# Patient Record
Sex: MALE | Race: WHITE
[De-identification: names, ages, dates, MRNs, and addresses within clinical notes are randomized per-mention and may not be internally consistent; named-entity substitution may affect disease eponyms.]

---

## 2017-05-04 NOTE — RADHPO
EXAM DATE/TIME:  05/04/2017 19:37 

 

[facility] COMPARISON:     

No previous studies available for comparison.

 

 

INDICATIONS :     

Diffuse abdominal pain. 

                      

 

IV CONTRAST:     

90 cc Omnipaque 350 (iohexol) IV 

 

 

ORAL CONTRAST:      

No oral contrast ingested.

                      

 

RADIATION DOSE:     

4.45 CTDIvol (mGy) 

 

 

MEDICAL HISTORY :     

Chronic obstructive pulmonary disease.  Miguel's esophagus. non hodgkins lymphoma.

 

SURGICAL HISTORY :      

None. 

 

ENCOUNTER:      

Initial

 

ACUITY:      

1 day

 

PAIN SCALE:      

6/10

 

LOCATION:         

Abdomen. 

 

TECHNIQUE:     

Volumetric scanning of the abdomen and pelvis was performed.  Using automated exposure control and ad
justment of the mA and/or kV according to patient size, radiation dose was kept as low as reasonably 
achievable to obtain optimal diagnostic quality images. 

 

FINDINGS:     

 

LOWER LUNGS:     

There is chronic appearing interstitial disease seen at the lung bases being worse on the right. Ther
e is superimposed consolidation at the bases being worse on the right.

 

LIVER:     

Homogeneous density without lesion.  There is no dilation of the biliary tree.  No calcified gallston
es.

 

SPLEEN:     

Normal size without lesion.

 

PANCREAS:     

Within normal limits.

 

KIDNEYS:     

Normal in size and shape.  There is no mass, stone or hydronephrosis.

 

ADRENAL GLANDS:     

Within normal limits.

 

VASCULAR:     

Atherosclerotic calcifications are seen throughout the arterial system. There is a 3.4 cm infrarenal 
abdominal aortic aneurysm.

 

BOWEL/MESENTERY:     

The stomach, small bowel, and colon demonstrate no acute abnormality.  There is no free intraperitone
al air or fluid.

 

ABDOMINAL WALL:     

Within normal limits.

 

RETROPERITONEUM:     

There is no lymphadenopathy. 

 

BLADDER:     

No wall thickening or mass. 

 

REPRODUCTIVE:     

Within normal limits.

 

INGUINAL:     

There is no lymphadenopathy or hernia. 

 

MUSCULOSKELETAL:     

There is degenerative change in the lower lumbar spine. There appears to be possible pars defect and 
spondylolisthesis at the L5-S1 level.

 

CONCLUSION:     

1. No acute intra-abdominal abnormality is seen.

2. Chronic changes the lung bases with some superimposed consolidation. These findings are worse on t
he right.

3. 3.4 cm abdominal aortic aneurysm.

4. Anterior spondylolisthesis of L5 on S1.

 

 

 

 Malik Carnes MD on May 04, 2017 at 20:12           

Board Certified Radiologist.

 This report was verified electronically.

## 2017-05-04 NOTE — RADHPO
EXAM DATE/TIME:  05/04/2017 19:31 

 

[facility] COMPARISON:     

No previous studies available for comparison.

 

 

INDICATIONS :     

Altered mental status. 

                      

 

RADIATION DOSE:     

62.13 CTDIvol (mGy) 

 

 

 

MEDICAL HISTORY :     

Dementia.  

 

SURGICAL HISTORY :      

None. 

 

ENCOUNTER:      

Initial

 

ACUITY:      

1 month

 

PAIN SCALE:      

4/10

 

LOCATION:        

cranial 

 

TECHNIQUE:     

Multiple contiguous axial images were obtained of the head.  Using automated exposure control and adj
ustment of the mA and/or kV according to patient size, radiation dose was kept as low as reasonably a
chievable to obtain optimal diagnostic quality images. 

 

FINDINGS:     

 

CEREBRUM:     

The ventricles and cortical sulci are widened. There is low-density seen throughout the cerebral whit
e matter.  No evidence of midline shift, mass lesion, hemorrhage or acute infarction.  No extra-axial
 fluid collections are seen.

 

POSTERIOR FOSSA:     

The cerebellum and brainstem are intact.  The 4th ventricle is midline.  The cerebellopontine angle i
s unremarkable.

 

EXTRACRANIAL:     

The visualized portion of the orbits is intact.

 

SKULL:     

The calvaria is intact.  No evidence of skull fracture.

 

CONCLUSION:     

1. No acute abnormality.

2. Age-related atrophy and suspected small vessel ischemic change in the white matter.

 

 

 

 Malik Carnes MD on May 04, 2017 at 20:05           

Board Certified Radiologist.

 This report was verified electronically.

## 2017-05-04 NOTE — PD
HPI


Chief Complaint:  Altered Mental Status


Time Seen by Provider:  17:26


Travel History


International Travel<30 days:  No


Contact w/Intl Traveler<30days:  No


Traveled to known affect area:  No





History of Present Illness


HPI


76-year-old male seems to have multiple issues.  He was referred here from Dr. Dotson's office.  He is here with a gentleman who knows him who says that 

the gentleman has had a dramatic decline in his mental abilities in the last 

month or so.  The patient lives in a house with his wife.  He reportedly has a 

history of lymphoma and is seen by Dr. David.  Patient is not aware of what kind 

of treatment he has had.  His complaint to me is that he is short of breath.  

He has been a smoker in the past.  He says that he stopped 3 months ago though 

he actually has no idea what the date is now.  The patient says she's been 

having some pain across his abdomen.  He does not have old records at this 

hospital there is indication that he has a history of COPD non-Hodgkin's 

lymphoma, GERD with Miguel's esophagus,abdominal aortic aneurysm.  He lives 

with his wife who has dementia.  The patient apparently has been driving.  I 

have tried to contact Dr. David but HE is not available.  The patient did have a 

CT of his chest done a few months ago which showed some adenopathy and 

emphysematous changes.  He also had a CT of his abdomen and pelvis which showed 

some inguinal adenopathy.  He had a 2.8 cm abdominal aortic aneurysm.





PFSH


Past Medical History


COPD:  Yes





Social History


Tobacco Use:  No





Allergies-Medications


(Allergen,Severity, Reaction):  


Coded Allergies:  


     No Known Allergies (Unverified , 5/4/17)


Reported Meds & Prescriptions





Reported Meds & Active Scripts


Active


Active Prescriptions or Reported Medications Unobtainable    








Review of Systems


General / Constitutional:  Positive: Weight Loss,  No: Fever, Chills


Eyes:  No: Diploplia, Blurred Vision


HENT:  No: Headaches, Neck Stiffness


Respiratory:  Positive: Cough, Shortness of Breath


Gastrointestinal:  Positive: Nausea, Abdominal Pain, Loss of Appetite


Genitourinary:  No: Urgency, Frequency


Musculoskeletal:  No: Myalgias, Arthralgias


Skin:  No Rash


Neurologic:  Positive: Change in Mentation


Endocrine:  No: Heat Intolerance


Hematologic/Lymphatic:  No: Easy Bruising





Physical Exam


Narrative


GENERAL thin cachectic appearing male oxygen saturation is 92% on room air.  He 

is in mild respiratory distress


SKIN: Focused skin assessment warm/dry.  Her skin turgor


HEAD: Atraumatic. Normocephalic. 


EYES: Pupils equal and round. No scleral icterus. No injection or drainage. 


ENT: No nasal bleeding or discharge.  Mucous membranes pink and moist.


NECK: Trachea midline. No JVD. 


CARDIOVASCULAR: Regular rate and rhythm.  No murmur appreciated.


RESPIRATORY: Diminished breath sounds bilaterally.  Occasional rhonchi


GASTROINTESTINAL: Abdomen soft, there is some diffuse abdominal tenderness, 

nondistended. Hepatic and splenic margins not palpable. 


MUSCULOSKELETAL: No obvious deformities. No clubbing.  No cyanosis.  No edema. 


NEUROLOGICAL: Awake and alert. No obvious cranial nerve deficits.  Motor 

grossly within normal limits. Normal speech.  Patient is quite confused.  He is 

oriented to place but not time


PSYCHIATRIC: Insight limited





Data


Data


Last Documented VS





Vital Signs








  Date Time  Temp Pulse Resp B/P Pulse Ox O2 Delivery O2 Flow Rate FiO2


 


5/4/17 19:38      Room Air  21


 


5/4/17 18:30  90 16 103/64 95   


 


5/4/17 17:25 99.1       








Orders





 Electrocardiogram (5/4/17 17:37)


Complete Blood Count With Diff (5/4/17 17:37)


Comprehensive Metabolic Panel (5/4/17 17:37)


Troponin I (5/4/17 17:37)


Prothrombin Time / Inr (Pt) (5/4/17 17:37)


Act Partial Throm Time (Ptt) (5/4/17 17:37)


Blood Culture (5/4/17 17:37)


Lipase (5/4/17 17:37)


Urinalysis - C+S If Indicated (5/4/17 17:37)


Magnesium (Mg) (5/4/17 17:37)


Thyroid Stimulating Hormone (5/4/17 17:37)


Chest, Single Ap (5/4/17 17:37)


Albuterol-Ipratropium Neb (Duoneb Neb) (5/4/17 17:45)


Sodium Chlor 0.9% 1000 Ml Inj (Ns 1000 M (5/4/17 18:00)


Ct Brain W/O Iv Contrast(Rout) (5/4/17 18:14)


Ct Abd/Pel W Iv Contrast(Rout) (5/4/17 18:14)


Methylprednisolone So Succ Inj (Solumedr (5/4/17 19:00)


Pantoprazole Inj (Protonix Inj) (5/4/17 19:00)


Piperacil-Tazo 3.375 Gm Premix (Zosyn 3. (5/4/17 19:15)


Iohexol 350 Inj (Omnipaque 350 Inj) (5/4/17 19:59)


Admit Order (Ed Use Only) (5/4/17 20:50)





Labs





 Laboratory Tests








Test 5/4/17





 18:20


 


White Blood Count 23.1 TH/MM3


 


Red Blood Count 4.68 MIL/MM3


 


Hemoglobin 15.1 GM/DL


 


Hematocrit 44.8 %


 


Mean Corpuscular Volume 95.6 FL


 


Mean Corpuscular Hemoglobin 32.3 PG


 


Mean Corpuscular Hemoglobin 33.7 %





Concent 


 


Red Cell Distribution Width 12.3 %


 


Platelet Count 336 TH/MM3


 


Mean Platelet Volume 8.1 FL


 


Neutrophils (%) (Auto) 69.5 %


 


Lymphocytes (%) (Auto) 20.7 %


 


Monocytes (%) (Auto) 8.0 %


 


Eosinophils (%) (Auto) 0.1 %


 


Basophils (%) (Auto) 1.7 %


 


Neutrophils # (Auto) 16.1 TH/MM3


 


Lymphocytes # (Auto) 4.8 TH/MM3


 


Monocytes # (Auto) 1.8 TH/MM3


 


Eosinophils # (Auto) 0.0 TH/MM3


 


Basophils # (Auto) 0.4 TH/MM3


 


CBC Comment DIFF FINAL 


 


Differential Comment  


 


Prothrombin Time 10.9 SEC


 


Prothromb Time International 1.0 RATIO





Ratio 


 


Activated Partial 24.0 SEC





Thromboplast Time 


 


Sodium Level 142 MEQ/L


 


Potassium Level 4.0 MEQ/L


 


Chloride Level 104 MEQ/L


 


Carbon Dioxide Level 30.7 MEQ/L


 


Anion Gap 7 MEQ/L


 


Blood Urea Nitrogen 20 MG/DL


 


Creatinine 0.70 MG/DL


 


Estimat Glomerular Filtration 109 ML/MIN





Rate 


 


Random Glucose 127 MG/DL


 


Calcium Level 9.4 MG/DL


 


Magnesium Level 2.5 MG/DL


 


Total Bilirubin 0.5 MG/DL


 


Aspartate Amino Transf 20 U/L





(AST/SGOT) 


 


Alanine Aminotransferase 26 U/L





(ALT/SGPT) 


 


Alkaline Phosphatase 75 U/L


 


Troponin I LESS THAN 0.02





 NG/ML


 


Total Protein 7.0 GM/DL


 


Albumin 3.1 GM/DL


 


Lipase 85 U/L


 


Thyroid Stimulating Hormone 1.720 uIU/ML





3rd Gen 











[facility]


Medical Decision Making


Medical Screen Exam Complete:  Yes


Emergency Medical Condition:  Yes


Medical Record Reviewed:  Yes (chest x-ray shows hyperinflation suggestive of 

COPD.  There is a small right effusion with parenchymal consolidation right 

lateral lung base.)


Differential Diagnosis


Differential includes COPD exacerbation, pneumonia, altered mental status, 

subdural, abdominal pain


Narrative Course


Chest x-ray shows hyperinflation suggestive of COPD.  There is a small right 

effusion with parenchymal consolidation in the right lateral lung base.  White 

count is elevated 23,000.  Patient may be on Medrol now there is not clear what 

medication she has been taking.  He has been started on Zosyn and Solu-Medrol.  

CT scan of the head shows no acute abnormality.  There is age-related atrophy.  

CT of the abdomen and pelvis does not show any acute intra-abdominal 

abnormality.  There are chronic changes in the lung bases from post 

consolidation.  There is a 3.4 cm abdominal aortic aneurysm.  Patient has been 

given nebulizer treatments and Solu-Medrol.  He is given an initial dose of 

Zosyn.  He continues to complain of shortness of breath.  Etiology for his 

altered mental status is not clear.  He appears to have dementia.  From that is 

here with him says that he is deteriorating quite a bit in the past month





Diagnosis





 Primary Impression:  


 Pneumonia


 Qualified Code:  J18.1 - Pneumonia of right lower lobe due to infectious 

organism


 Additional Impressions:  


 COPD exacerbation


 Altered mental status


 Qualified Code:  R41.0 - Disorientation





Admitting Information


Admitting Physician Requests:  Admit


Scripts


Unable to Obtain Active Prescriptions or Reported Meds








Farhat Simon MD May 4, 2017 17:50

## 2017-05-04 NOTE — RADHPO
EXAM DATE/TIME:  05/04/2017 17:53 

 

[facility] COMPARISON:     

No previous studies available for comparison.

 

                     

INDICATIONS :     

Patient has been short of breath for four days.

                     

 

MEDICAL HISTORY :     

Chronic obstructive pulmonary disease.          

 

SURGICAL HISTORY :     

None.   

 

ENCOUNTER:     

Initial                                        

 

ACUITY:     

4 - 6 days      

 

PAIN SCORE:     

0/10

 

LOCATION:     

Bilateral chest 

 

FINDINGS:     

Single portable frontal view of the chest show the lungs to be hyperaerated. Blunting of the right co
stophrenic angle noted with increasing density in the adjacent lung parenchyma. Heart is normal in si
ze. Bony structures are unremarkable.

 

CONCLUSION:     

1. Hyperinflation suggesting COPD.

2. Small right effusion with parenchymal consolidation within the right lateral lung base. Followup s
tudies to document resolution suggested.

 

 

 

 Keith Andre Jr., MD on May 04, 2017 at 18:10           

Board Certified Radiologist.

 This report was verified electronically.

## 2017-05-05 NOTE — PD.CONS
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 this point.  There 

is no known written designation of health care surrogate.  Ordinarily, the 

patient's spouse would be the healthcare proxy.  However, wife has severe 

dementia..  Healthcare decision-making would then fall to the 2 children.  The 

sons live out of state and we currently do not have contact information for 

them.  Therefore, at this time, we don't have a legal decision-maker.





==  Goals of medical treatment:  Until such time as the patient's cognitive 

abilities improve and he becomes capacitated or until we are able to identify a 

health care proxy, we must continue with aggressive care including 

resuscitation attempts if necessary.





==  Getting additional information on this patient is very important.


* Recommend that Dr. David be contacted on Monday morning to see if he knows the 

status of the patient's lymphoma.  Also recommend we asked that office if they 

have a advance directive or contact information on file for health care decision

-maker.


* Recommend that Dr. Dotson be contacted on Monday morning to see if he has 

any additional information on status of the patient's lymphoma.  It is also 

important that we see if that office has an advanced directive on file or 

knowledge of inappropriate health care surrogate/proxy.


* I have contacted the patient's neighbor -- Johny Garcia -- who brought the 

patient and his wife to the doctor and to the emergency department.  This 

neighbor is taking care of the patient's dog and 2 cats and therefore has a key 

to the house.  I have asked the neighbor to see if there are phone numbers of 

either or both of the patient's sons on the refrigerator or near the telephone 

so we can try and contact them.





==  Pain: The patient denies any significant discomfort at this point in time.  

He did have abdominal pain at the time of presentation to the emergency 

department.  It could be his pain is being helped by the steroids.  Would 

recommend that pain be treated initially by acetaminophen.  If necessary, we 

can add an opiate analgesic.  Pain may also be due to constipationsee below





== Constipation:  Patient is unaware of his last bowel movement.  There is no 

mention of significant stool in the colon.  Nevertheless I would probably 

schedule the sennosides in addition to the PRN order. .





==  Weight loss/anorexia:  Patient is on intravenous steroids for his 

breathing.  It is likely this will stimulate appetite.





==  Dyspnea:  Dyspnea appears to be adequately treated with nasal cannula 

oxygen.  At this time which I away from opiates or benzos to treat dyspnea as 

this will probably exacerbate patient's confusion.





==  Confusion:  Confusion appears to be acute.  Etiology of the confusion is 

unclear at this time.  I am hoping that with hydration, nutrition, treatment of 

the COPD and possible infection that he will clear.  





==  History of alcohol abuse:  The patient is not a completely reliable 

historian, but he did indicate he can easily consume 8 or more beers per day.  

Uncertain when his last intake was.  May want to offer vitamins and watch for 

possible withdrawal symptoms.





==  Given the patient's history of Miguel's esophagus and given that he is now 

on high-dose steroids, may want to consider placing him on a proton pump 

inhibitor.





==  Disposition:  It is hard to imagine the patient being well enough following 

this acute care hospitalization to return home and be able to take care of his 

demented wife.  It is important that we contact family members if at all 

possible.  As noted above, the patient's neighbor, who was taking care of the 

patient's animals, we'll see if he can find contact information.  The wife also

, apparently, has a cousin who has been in touch.  Neighbor will see if that 

number is readily available in the house as well.





==  Palliative care will continue to follow to assist with symptom management.  

In addition, 


. if patient becomes capacitated or we are able to find a proxy decision-maker, 

palliative care will assist with discussions on goals of medical treatment as 

the clinical case evolves.





Time Spent


Total Floor Time (mins):  90 (Total floor time included chart review, patient 

examination, discussion with charge nurse, telephone call to the patient's 

neighbor, lengthy bedside conversation with patient noted above, and 

documentation.)


Face to Face Time (mins):  50


>50% Counseling/Coord of Care:  Yes





Thank you for the opportunity to participate in the care of Mr. Cano.


.





Attestation


To help prompt me to consider important information that might be impacting 

today's encounter and assessment, information from prior notes written by 

myself or my colleagues may have been "brought forward" into today's note.  My 

signature on this note, however, is an attestation that I personally performed 

the exam, history, and/or decision-making noted today, and, unless otherwise 

indicated, the interactions with patient, family, and staff as well as the 

review of records all occurred today.  I also attest that the listed assessment 

and stated plan reflect my best clinical judgment today based on the 

combination of historical information, prior notes, and today's exam/ 

interactions.  When time spent is documented, it refers only to time spent 

today by the signer, or if indicated, combined time spent today by 

collaborating physician/nurse practitioner.


.








Awais Alejandro MD May 5, 2017 19:24

## 2017-05-05 NOTE — EKG
Date Performed: 05/04/2017       Time Performed: 17:21:00

 

PTAGE:      76 years

 

EKG:      Sinus tachycardia with PAC(s). Left axis deviation Inferior infarct - age undetermined Low 
QRS voltages in limb leads Abnormal ECG 

 

NO PREVIOUS TRACING            

 

DOCTOR:   Isrrael Pedraza  Interpretating Date/Time  05/05/2017 15:32:52

## 2017-05-05 NOTE — MH
cc:

RASHAD FERRIS MD

****

 

DATE OF ADMISSION:  5/4/2017

 

CHIEF COMPLAINT

Altered mental status.

 

HISTORY OF PRESENT ILLNESS

This is a 76-year-old  male with past medical history significant for

COPD and multiple admissions for altered mental status in the past.  He lives

with his wife who has a history of dementia.  The patient's mental status is

also declining.  DCF is involved per the ER charge nurse and the 

is working on it.  The patient saw Dr. Dotson and had a decline in mental

status.  He has a history of lymphoma and is seen by Dr. David.  He is not aware

of what kind of treatment he is getting.  He had been a smoker in the past and

stopped 3 months ago.  The patient does not know which hospital he is in.  He

is not able to answer other questions which I asked.  He has a history of GERD,

Miguel's esophagus and abdominal aortic aneurysm.  He apparently has been

driving and tried to contact Dr. David, but he was not available.  He had a CT

of the chest done a few months ago which showed some adenopathy and

emphysematous changes.  CT of the abdomen and pelvis shows some groin

adenopathy.  Other than that nothing significant.  He is awaiting placement.

DCF is involved.

 

PAST MEDICAL/SURGICAL HISTORY

As dictated above.

 

SOCIAL HISTORY

Does not smoke, drink or take any drugs.  He lives at home with his wife who is

demented.

 

ALLERGIES

No known drug allergies.

 

FAMILY HISTORY

Unable to obtain.

 

MEDICATIONS

Unable to obtain the list of medication.

 

REVIEW OF SYSTEMS

Unable to obtain because the patient is not able to provide a good history and

review of systems.

 

PHYSICAL EXAMINATION

GENERAL:  This is a 76-year-old male sitting on the bed, not in acute distress.

 

VITAL SIGNS:  Temperature 98.1, heart rate 75, respirations 18, blood pressure

103/69.  O2 saturation 99% on two liter nasal cannula.

HEENT:  Normocephalic, atraumatic.  EOMI.  PERRL.  Oral mucosa moist.

NECK:  Supple.  No visible thyromegaly or neck mass.  Trachea is central.

CV:  Regular rate and rhythm.

LUNGS:  Respirations clear to auscultation bilaterally.

ABDOMEN:  Soft, nontender.  Bowel sounds audible.

EXTREMITIES:  No cyanosis.  No clubbing.  Full range of motion of all

extremities.

NEUROLOGIC:  Awake and alert, but not oriented  Moving all extremities.  Speech

is normal.  Most likely has dementia.

SKIN:  Warm and dry.

PSYCHIATRIC:  Patient is cooperative.  Mood and affect normal.

 

LABORATORY

CBC:  WBC count was 23.1, now 15.7.  RBC count 4.01 low.  Hemoglobin 13.1

normal.  Hematocrit 38.4 low.  Neutrophil percentage 78.0 high.

 

CMP unremarkable except for glucose 185 high, troponin-I less than 0.02, total

protein 7.0, albumin 3.1 low.

 

TSH 1.72.

 

PT 10.9, INR 1.0, APTT 24.0.

 

Urinalysis showed specific gravity greater than 1.035, glucose 100 high, trace

ketones.

 

Blood cultures x2 are negative so far.

 

IMAGING

Chest x-ray shows hyperinflation suggesting COPD, small right effusion with

parenchymal consolidation within the right lateral lung base.  Follow-up study

for resolution suggested.

 

CT brain shows no acute abnormality, age-related atrophy and suspected

small-vessel ischemic changes in the white matter.

 

CT abdomen and pelvis shows no acute intra-abdominal abnormality seen.  Chronic

changes in the lung with some superimposed consolidation.  These findings are

worse on the right.  A 3.4 cm abdominal aortic aneurysm.  Anterior

spondylolisthesis of L5 on S1.

 

ASSESSMENT AND PLAN

1. This is a 76-year-old male who came to the ER diagnosed with altered mental

   status.  CT brain does not show anything acute.   is consulted

   for placement.

2. Pneumonia.  The patient is on Zosyn IV, Zithromax p.o. and  Solu-Medrol 60

   mg q.6h.

3. COPD.  Continue home medication.

4. History of gastroesophageal reflux disease and Miguel's esophagus.

5. History of non-Hodgkin's lymphoma followed by Dr. David,

   hematologist/oncologist.

6. DVT prophylaxis.  Heparin 5000 units subcutaneous twice a day.

7. GI prophylaxis.  Protonix 40 mg p.o. daily.

8. We are going to manage the patient on a daily basis and make recommendations

   on a daily basis.

 

 

 

 

                               __________________________________

                           Rashad Ferris MD EA/SANDI

D:  5/5/2017/8:52 AM

T:  5/5/2017/9:11 AM

Visit #:  [id number]

Job #:  [id number]

## 2017-05-06 NOTE — HHI.PR
Subjective


History of Present Illness


Patient confused  no acute issue.





Review of Systems


Constitutional


Constitutional Remarks


Review of system not obtainable because of confusion.





Vitals/Results


Intake & Output











 5/5/17 5/5/17 5/6/17





 15:00 23:00 07:00


 


Intake Total  1457 ml 815 ml


 


Output Total 400 ml 350 ml 325 ml


 


Balance -400 ml 1107 ml 490 ml


 


   


 


Intake Oral   240 ml


 


IV Total  1457 ml 575 ml


 


Output Urine Total 400 ml 350 ml 325 ml


 


# Voids  0 








Vital Signs





 Vital Signs








  Date Time  Temp Pulse Resp B/P Pulse Ox O2 Delivery O2 Flow Rate FiO2


 


5/6/17 08:00 96.8 71 16 119/69 94   


 


5/6/17 05:34        


 


5/6/17 00:03 96.8 73 16 129/76 93   


 


5/5/17 21:00     96 Nasal Cannula 1.00 


 


5/5/17 20:15     96 Nasal Cannula 2.00 21


 


5/5/17 20:03 95.6 81 14 98/53 96   


 


5/5/17 16:00 97.5 77 17 108/79 95   


 


5/5/17 12:00 97.9 77 18 111/71 96   


 


5/5/17 10:08     97 Nasal Cannula 2.00 








CBC/BMP:  


5/6/17 0728                                                                    

            5/6/17 0728





Lab Results





Laboratory Tests








Test 5/5/17 5/6/17





 15:28 07:28


 


Sodium Level 143 MEQ/L 146 MEQ/L


 


Potassium Level 3.8 MEQ/L 4.0 MEQ/L


 


Chloride Level 107 MEQ/L 110 MEQ/L


 


Carbon Dioxide Level 27.1 MEQ/L 28.6 MEQ/L


 


Anion Gap 9 MEQ/L 7 MEQ/L


 


Blood Urea Nitrogen 18 MG/DL 16 MG/DL


 


Creatinine 0.73 MG/DL 0.54 MG/DL


 


Estimat Glomerular Filtration 104 ML/ ML/MIN





Rate  


 


Random Glucose 162 MG/ MG/DL


 


Calcium Level 8.5 MG/DL 8.8 MG/DL


 


White Blood Count  20.3 TH/MM3


 


Red Blood Count  3.92 MIL/MM3


 


Hemoglobin  12.7 GM/DL


 


Hematocrit  38.1 %


 


Mean Corpuscular Volume  97.1 FL


 


Mean Corpuscular Hemoglobin  32.3 PG


 


Mean Corpuscular Hemoglobin  33.3 %





Concent  


 


Red Cell Distribution Width  13.0 %


 


Platelet Count  342 TH/MM3


 


Mean Platelet Volume  7.9 FL


 


Neutrophils (%) (Auto)  71.2 %


 


Lymphocytes (%) (Auto)  25.1 %


 


Monocytes (%) (Auto)  2.8 %


 


Eosinophils (%) (Auto)  0.1 %


 


Basophils (%) (Auto)  0.8 %


 


Neutrophils # (Auto)  14.4 TH/MM3


 


Lymphocytes # (Auto)  5.1 TH/MM3


 


Monocytes # (Auto)  0.6 TH/MM3


 


Eosinophils # (Auto)  0.0 TH/MM3


 


Basophils # (Auto)  0.2 TH/MM3


 


CBC Comment  DIFF FINAL 


 


Differential Comment   


 


Total Bilirubin  0.3 MG/DL


 


Aspartate Amino Transf  20 U/L





(AST/SGOT)  


 


Alanine Aminotransferase  24 U/L





(ALT/SGPT)  


 


Alkaline Phosphatase  56 U/L


 


Total Protein  5.5 GM/DL


 


Albumin  2.4 GM/DL











Physical Exam


General


General Appearance:  No Acute Distress, Comfortable





Eyes


Eye Exam:  Pupils Equal, Pupils Reactive, Sclera White, Extraocular Movement 

Intact





Throat


Throat Exam:  Oral Mucosa Pink & Moist, Oral Pharynx Normal





Neck


Neck Exam:  Neck Supple, Trachea Midline





Pulmonary


Resp Exam:  Clear Bilaterally, Breath Sounds Equal





Cardiology


CV Exam:  Regular, Normal Sinus Rhythm





Gastrointestinal/Abdomen


GI Exam:  Soft, Non-Tender, Bowel Sounds Present





Musculoskeletal


MS Exam:  Normal Tone





Integumentary


Skin Exam:  Warm, Dry





Extremeties


Extremities Exam:  No Edema





Neurologic


Neuro Exam:  Alert, Awake, Moving All Extremities


Neuro Remarks


confused.





VTE Prophylaxis


VTE Prophylaxis Meds:  Heparin





PUD Prophylasis


PUD Prophylaxis:  Protonix





Assessment/Plan


Assessment/Plan


ASSESSMENT AND PLAN


1. This is a 76-year-old male who came to the ER diagnosed with altered mental


   status.  CT brain does not show anything acute.   input noted


   for placement.


2. Pneumonia.  The patient is on Zosyn IV, Zithromax p.o. and  Solu-Medrol 60


   mg q.6h.


3. COPD.  Continue home medication.


4. History of gastroesophageal reflux disease and Miguel's esophagus.


5. History of non-Hodgkin's lymphoma followed by Dr. David,


   hematologist/oncologist.


6. DVT prophylaxis.  Heparin 5000 units subcutaneous twice a day.


7. GI prophylaxis.  Protonix 40 mg p.o. daily.


8. Leukocytosis secondary to pneumonia...will monitor.


 We are going to manage the patient on a daily basis and make recommendations


   on a daily basis.


Check CBC with diff CMP in AM.








Rashad Ferris MD May 6, 2017 09:18

## 2017-05-06 NOTE — PQ
Physician Query Response Document

 

 

PATIENT:               JARETT SEVERINO

ACCT #:                  [id number]

MRN:                       [id number]

:                       1940

ADMIT DATE:       2017 8:51 PM

DISCH DATE:

RESPONDING

PROVIDER #:        [id number]

 

 

QUERY TEXT:

 

COPD Specificity

 

COPD - Chronic Obstructive Pulmonary Disease is documented in the Medical Record. Please specify the 
associated condition (includes suspected or probable)

Such as:

-- Bronchitis ? acute

-- Asthmatic ? with /without status asthmaticus

-- Exacerbation ? acute

-- Lower respiratory infection - acute

-- Other, please specify

 

The patient's Clinical Indicators include:

SOLUMEDROL IV Q 6 HR

DUONEBLIZER Q 6 HR AND Q 2 PRN

 

 

Query created by: Amaya Mcguire on 2017 2:28 PM

 

RESPONSE TEXT:

 

Pneumonia and COPD Exacerbation.

 

 

 

 

 

 

 

Electronically signed by:  Rashad Ferris MD 2017 9:16 AM

## 2017-05-07 NOTE — HHI.FF
Face to Face Verification


Diagnosis:  


(1) Altered mental status


(2) Cachexia


(3) Pneumonia


(4) COPD exacerbation


Physical Therapy


Order:  Evaluate and Treat, Improve ambulation, Strength and gait training





Occupational Therapy


Order:  Evaluate and Treat, Gross motor coordination





Home Health Nursing


Order:     Medical education


      Medication education-adverse effect





Home Health Aide


Order: To Assist In:  Bathing and personal care, Household chores and meal prep








I have seen patient Neal Cano on 5/7/17. My clinical findings support the 

need for the requested home health care services because:


Ltd mobility - disease progression


Limited ability to care for self


Impaired cognition/judgement


Infection w/ risk of complications








I certify that my clinical findings support that this patient is homebound 

because:


Impaired cognitive ability/safety


Unsteady gait/balance


Unsafe to leave home unassisted








Rashad Ferris MD May 7, 2017 11:31

## 2017-05-07 NOTE — MD
cc:

RASHAD SHARMA MD

****

 

 

 

ADMISSION DATE:  5/4/2017    DISCHARGE DATE:  [facility] Visit Search.Discharge

Date

 

DATE OF DISCHARGE

05/7/2017

 

Okay to discharge the patient home with son. Condition at the time of discharge

satisfactory. Activity as tolerated.  Diet cardiac diet.

 

ALLERGIES

NO KNOWN DRUG ALLERGIES.

 

DISCHARGE MEDICATIONS

Include:

1. Zithromax 500 mg p.o. daily for 7 days.

2. Ceftin 500 mg twice a day for 10 days.

 

The patient advised to follow up with oncology, PCP and pulmonology in one

week.

 

ADMISSION DIAGNOSIS

Altered mental status, most likely is Alzheimer's dementia.

 

The patient lives along with the wife and wife has dementia and the patient

also has altered mental status and dementia and Alzheimer's, ________.   The

patient's son came on 5/7/2017 and the patient's son said he would live with

the parents.

 

Other comorbidities include:

Non Hodgkin's lymphoma.

COPD.

History of gastroesophageal reflux disease.

Leukocytosis because of non-Hodgkin's lymphoma.

Pneumonia.

________.

Pneumonia.

 

The patient was on IV Zosyn and Zithromax.

 

HOSPITAL COURSE

This is a 76-year-old male admitted with altered mental status. A CT scan of

the brain was done which showed nothing acute. The patient has Alzheimer's

dementia. The patient remains stable. No acute event happened.

 

The patient discharged in satisfactory condition.  The patient's son came on

05/07/2017.  The patient had mild hyponatremia.  The patient also has a low

total protein 5.6 and a low albumin 2.5.  The patient's TSH was normal.  Lipase

was 85.

 

Further details in the medical record.

 

 

 

                              _________________________________

                              Rashad Sharma MD EA/FAITH

D:  5/7/2017/12:10 PM

T:  5/7/2017/3:37 PM

Visit #:  [id number]

Job #:  [id number]

## 2017-05-07 NOTE — HHI.PR
Subjective


History of Present Illness


Patient confused  no acute issue. DCF involved  son  will stay with both 

parents at home Ok to DC home with home health care and son.  d/w   

fredy at bed side.





Review of Systems


Constitutional


Constitutional Remarks


Review of system not obtainable because of confusion.





Vitals/Results


Intake & Output











 5/6/17 5/6/17 5/7/17





 15:00 23:00 07:00


 


Intake Total 930 ml 570 ml 1264 ml


 


Output Total  400 ml 1200 ml


 


Balance 930 ml 170 ml 64 ml


 


   


 


Intake Oral 930 ml 570 ml 


 


IV Total   1264 ml


 


Output Urine Total  400 ml 1200 ml


 


# Voids 2 0 








Vital Signs





 Vital Signs








  Date Time  Temp Pulse Resp B/P Pulse Ox O2 Delivery O2 Flow Rate FiO2


 


5/7/17 08:00 96.2 74 20 119/73 94   


 


5/7/17 00:38 96.2 81 20 121/78 96   


 


5/6/17 21:09     96 Nasal Cannula 1.00 


 


5/6/17 20:43 97.2 87 22 102/69 94   


 


5/6/17 20:00     94 Nasal Cannula 2.00 


 


5/6/17 17:21      Nasal Cannula 2.00 


 


5/6/17 16:00 96.4 91 16 124/68 96   


 


5/6/17 12:00 96.7 85 16 111/71 94   








CBC/BMP:  


5/7/17 0612                                                                    

            5/7/17 0612





Lab Results





Laboratory Tests








Test 5/7/17





 06:12


 


White Blood Count 24.9 TH/MM3


 


Red Blood Count 3.98 MIL/MM3


 


Hemoglobin 13.0 GM/DL


 


Hematocrit 38.3 %


 


Mean Corpuscular Volume 96.0 FL


 


Mean Corpuscular Hemoglobin 32.7 PG


 


Mean Corpuscular Hemoglobin 34.0 %





Concent 


 


Red Cell Distribution Width 12.2 %


 


Platelet Count 371 TH/MM3


 


Mean Platelet Volume 8.3 FL


 


Neutrophils (%) (Auto)  %


 


Lymphocytes (%) (Auto)  %


 


Monocytes (%) (Auto)  %


 


Eosinophils (%) (Auto)  %


 


Basophils (%) (Auto)  %


 


Neutrophils # (Auto)  TH/MM3


 


Lymphocytes # (Auto)  TH/MM3


 


Monocytes # (Auto)  TH/MM3


 


Eosinophils # (Auto)  TH/MM3


 


Basophils # (Auto)  TH/MM3


 


CBC Comment AUTO DIFF 


 


Differential Total Cells 100 





Counted 


 


Neutrophils % (Manual) 52 %


 


Lymphocytes % 42 %


 


Monocytes % 6 %


 


Neutrophils # (Manual) 12.9 TH/MM3


 


Differential Comment FINAL DIFF





 MANUAL


 


Platelet Morphology Comment NORMAL 


 


Sodium Level 144 MEQ/L


 


Potassium Level 3.9 MEQ/L


 


Chloride Level 107 MEQ/L


 


Carbon Dioxide Level 29.5 MEQ/L


 


Anion Gap 8 MEQ/L


 


Blood Urea Nitrogen 14 MG/DL


 


Creatinine 0.56 MG/DL


 


Estimat Glomerular Filtration 141 ML/MIN





Rate 


 


Random Glucose 94 MG/DL


 


Calcium Level 8.4 MG/DL


 


Total Bilirubin 0.2 MG/DL


 


Aspartate Amino Transf 33 U/L





(AST/SGOT) 


 


Alanine Aminotransferase 41 U/L





(ALT/SGPT) 


 


Alkaline Phosphatase 61 U/L


 


Total Protein 5.6 GM/DL


 


Albumin 2.5 GM/DL











Physical Exam


General


General Appearance:  No Acute Distress, Comfortable





Eyes


Eye Exam:  Pupils Equal, Pupils Reactive, Sclera White, Extraocular Movement 

Intact





Throat


Throat Exam:  Oral Mucosa Pink & Moist, Oral Pharynx Normal





Neck


Neck Exam:  Neck Supple, Trachea Midline





Pulmonary


Resp Exam:  Clear Bilaterally, Breath Sounds Equal





Cardiology


CV Exam:  Regular, Normal Sinus Rhythm





Gastrointestinal/Abdomen


GI Exam:  Soft, Non-Tender, Bowel Sounds Present





Musculoskeletal


MS Exam:  Normal Tone





Integumentary


Skin Exam:  Warm, Dry





Extremeties


Extremities Exam:  No Edema





Neurologic


Neuro Exam:  Alert, Awake, Moving All Extremities


Neuro Remarks


confused.





VTE Prophylaxis


VTE Prophylaxis Meds:  Heparin





PUD Prophylasis


PUD Prophylaxis:  Protonix





Assessment/Plan


Assessment/Plan


ASSESSMENT AND PLAN


1. This is a 76-year-old male who came to the ER diagnosed with altered mental


   status.  CT brain does not show anything acute.   input noted


   for placement.


2. Pneumonia.  The patient is on Zosyn IV, Zithromax p.o. and  Solu-Medrol 60


   mg q.6h.


3. COPD.  Continue home medication.


4. History of gastroesophageal reflux disease and Miguel's esophagus.


5. History of non-Hodgkin's lymphoma followed by Dr. David,


   hematologist/oncologist.


6. DVT prophylaxis.  Heparin 5000 units subcutaneous twice a day.


7. GI prophylaxis.  Protonix 40 mg p.o. daily.


8. Leukocytosis have h/o non Hodgkin lymphoma  +  pneumonia...will monitor.


 DCF involved  son  will stay with both parents at home Ok to DC home with home 

health care and son.  d/w   fredy at bed side.


f/u with pcp 1 week.


Discussed Condition with:  Rashad Lou MD May 7, 2017 09:28

## 2017-06-20 NOTE — RSPPFT
DATE OF PROCEDURE:   6/14/17



COMMENTS:   



Spirometry with FVC of 3.8, FEV1 of 1.2, FEV1/FVC ratio 32%. A non-significant response to 
acutely inhaled bronchodilator. Room air arterial blood gases show pH of 7.43, PCO2 of 37, 
PO2 of 86. Slow vital capacity at 77% of predicted. TLC is 125%. Diffusion capacity is 
severely reduced.   



IMPRESSION:    

   

1.    Severe airways obstruction.

2.    Non-significant response to acutely inhaled bronchodilator.

3.    Severe reduction in diffusion capacity.

4.    Adequate oxygenation and alveolar ventilation.

## 2017-12-27 NOTE — RADRPT
EXAM DATE/TIME:  12/27/2017 15:11 

 

[facility] COMPARISON:     

CHEST SINGLE AP, May 04, 2017, 17:53.

 

                     

INDICATIONS :     

Shortness of breath.

                     

 

MEDICAL HISTORY :     

Chronic obstructive pulmonary disease.       Miguel's esophagus. non hodgkins lymphoma.   

 

SURGICAL HISTORY :     

None.   

 

ENCOUNTER:     

Initial                                        

 

ACUITY:     

1 day      

 

PAIN SCORE:     

4/10

 

LOCATION:     

Bilateral chest 

 

FINDINGS:     

Hyperexpanded lungs with diffuse interstitial prominence. Very subtle residual opacity at the extreme
 right lung base in the region of prior pleural-parenchymal disease. Cardiomediastinal contour within
 normal limits. Bony thorax is intact.

 

CONCLUSION:     

1. Changes of obstructive pulmonary disease.

2. Minimal residual parenchymal opacity at the extreme right lung base in the region of prior pleural
-parenchymal disease. This likely reflects scarring.

3. Otherwise, no acute abnormality.

 

 

 

 Fernando Medina MD on December 27, 2017 at 16:15           

Board Certified Radiologist.

 This report was verified electronically.

## 2017-12-27 NOTE — PD
HPI


Chief Complaint:  Respiratory Symptoms


Time Seen by Provider:  15:02


Travel History


International Travel<30 days:  No


Contact w/Intl Traveler<30days:  No


Traveled to known affect area:  No





History of Present Illness


HPI


Patient is a 77-year-old male brought in by his neighbor due to altered mental 

status and shortness of breath.  The neighbor says that he saw him yesterday he 

was very confused, he came over to help looking for his car.  The neighbor says 

he hasn't eaten in a few days.  The neighbor tried to take him for breakfast 

this morning, but the patient was very short of breath, he brought him into the 

emergency department.  Patient has a history of COPD and wears home oxygen.  He 

was found to have an oxygen saturation of 83%.  Patient says he has been 

coughing.  He denies having any fever or any pains.  He provides very little 

other history.  He says he has not used the albuterol inhaler because he thinks 

it is too strong.





PFSH


Past Medical History


Anxiety:  Yes


Depression:  No


Cancer:  Yes (hx of non-hodgkin lymphoma)


Cardiovascular Problems:  No


COPD:  Yes


Dementia:  Yes


Diminished Hearing:  Yes


Endocrine:  No


Gastrointestinal Disorders:  Yes (barretts esophagus)


Genitourinary:  No


Immune Disorder:  No


Musculoskeletal:  No


Neurologic:  No


Psychiatric:  No


Reproductive:  No


Respiratory:  Yes (copd oxygen dependant)


Immunizations Current:  Yes


Tetanus Vaccination:  Unknown


Influenza Vaccination:  No





Past Surgical History


Surgical History:  No Previous Surgery





Social History


Alcohol Use:  Yes


Tobacco Use:  Yes


Substance Use:  No





Allergies-Medications


(Allergen,Severity, Reaction):  


Coded Allergies:  


     No Known Allergies (Unverified  Adverse Reaction, Unknown, 12/27/17)


Reported Meds & Prescriptions





Reported Meds & Active Scripts


Active


Oxygen tank (Oxygen) 1 Ea Tank 2 Liter CINDA.CANULA CONTINUOUS


     Oxygen Concentrator Portable Gaseous


     2 L/min via Nasal Cannula Continuous


     For 99 months


Reported


Proair Hfa 8.5 GM Inh (Albuterol Sulfate) 90 Mcg/Act Aer 2 Puff INH Q4-6H PRN


     108 mcg/actuation








Review of Systems


ROS Limitations:  Altered Mental Status


General / Constitutional:  No: Fever


Cardiovascular:  No: Chest Pain or Discomfort


Respiratory:  Positive: Cough, Shortness of Breath





Physical Exam


Narrative


GENERAL: Awake and alert, mildly confused.


SKIN: Focused skin assessment warm/dry.  No wounds or signs of infection.


HEAD: Atraumatic. Normocephalic. 


EYES: Pupils equal and round. No scleral icterus.  Extraocular movements intact.


ENT: Mucous membranes pink and moist.


NECK: Trachea midline. No JVD. 


CARDIOVASCULAR: Regular rate and rhythm.  No murmur appreciated.


RESPIRATORY: No accessory muscle use.  Decreased breath sounds throughout the 

lungs. Breath sounds equal bilaterally. 


GASTROINTESTINAL: Abdomen soft, non-tender, nondistended. 


MUSCULOSKELETAL: No obvious deformities. No clubbing.  No cyanosis.  No edema. 


NEUROLOGICAL: Awake and alert, but confused. No obvious cranial nerve deficits.

  Motor grossly within normal limits.  Slow speech





Data


Data


Last Documented VS





Vital Signs








  Date Time  Temp Pulse Resp B/P (MAP) Pulse Ox O2 Delivery O2 Flow Rate FiO2


 


12/27/17 16:14  90 20 143/65 (91) 95 Nasal Cannula 2.00 


 


12/27/17 15:42 98.5       








Orders





 Orders


Complete Blood Count With Diff (12/27/17 15:06)


Comprehensive Metabolic Panel (12/27/17 15:06)


Act Partial Throm Time (Ptt) (12/27/17 15:06)


Prothrombin Time / Inr (Pt) (12/27/17 15:06)


Troponin I (12/27/17 15:06)


Influenzae A/B Antigen (12/27/17 15:06)


Iv Access Insert/Monitor (12/27/17 15:06)


Electrocardiogram (12/27/17 15:06)


Ecg Monitoring (12/27/17 15:06)


Oximetry (12/27/17 15:06)


Oxygen Administration (12/27/17 15:06)


Chest, Single Ap (12/27/17 15:06)


Sodium Chloride 0.9% Flush (Ns Flush) (12/27/17 15:15)


Methylprednisolone So Succ Inj (Solumedr (12/27/17 15:15)


Albuterol-Ipratropium Neb (Duoneb Neb) (12/27/17 15:15)


Sodium Chlor 0.9% 1000 Ml Inj (Ns 1000 M (12/27/17 15:15)


Levofloxacin 750 Mg Premix Inj (Levaquin (12/27/17 16:30)


Albuterol Neb (Albuterol Neb) (12/27/17 16:30)


Admit Order (Ed Use Only) (12/27/17 )





Labs





Laboratory Tests








Test


  12/27/17


15:18


 


White Blood Count 7.8 TH/MM3 


 


Red Blood Count 4.90 MIL/MM3 


 


Hemoglobin 14.8 GM/DL 


 


Hematocrit 44.0 % 


 


Mean Corpuscular Volume 89.7 FL 


 


Mean Corpuscular Hemoglobin 30.1 PG 


 


Mean Corpuscular Hemoglobin


Concent 33.5 % 


 


 


Red Cell Distribution Width 13.1 % 


 


Platelet Count 102 TH/MM3 


 


Mean Platelet Volume 9.2 FL 


 


Neutrophils (%) (Auto) 53.9 % 


 


Lymphocytes (%) (Auto) 37.1 % 


 


Monocytes (%) (Auto) 8.2 % 


 


Eosinophils (%) (Auto) 0.2 % 


 


Basophils (%) (Auto) 0.6 % 


 


Neutrophils # (Auto) 4.3 TH/MM3 


 


Lymphocytes # (Auto) 2.9 TH/MM3 


 


Monocytes # (Auto) 0.6 TH/MM3 


 


Eosinophils # (Auto) 0.0 TH/MM3 


 


Basophils # (Auto) 0.0 TH/MM3 


 


CBC Comment DIFF FINAL 


 


Differential Comment  


 


Prothrombin Time 11.0 SEC 


 


Prothromb Time International


Ratio 1.1 RATIO 


 


 


Activated Partial


Thromboplast Time 28.9 SEC 


 


 


Blood Urea Nitrogen 23 MG/DL 


 


Creatinine 0.77 MG/DL 


 


Random Glucose 100 MG/DL 


 


Total Protein 7.0 GM/DL 


 


Albumin 4.0 GM/DL 


 


Calcium Level 8.9 MG/DL 


 


Alkaline Phosphatase 85 U/L 


 


Aspartate Amino Transf


(AST/SGOT) 33 U/L 


 


 


Alanine Aminotransferase


(ALT/SGPT) 20 U/L 


 


 


Total Bilirubin 0.4 MG/DL 


 


Sodium Level 137 MEQ/L 


 


Potassium Level 4.1 MEQ/L 


 


Chloride Level 101 MEQ/L 


 


Carbon Dioxide Level 28.2 MEQ/L 


 


Anion Gap 8 MEQ/L 


 


Estimat Glomerular Filtration


Rate 97 ML/MIN 


 


 


Troponin I 0.04 NG/ML 











[facility]


Medical Decision Making


Medical Screen Exam Complete:  Yes


Emergency Medical Condition:  Yes


Medical Record Reviewed:  Yes


Interpretation(s)


ECG shows sinus tachycardia at a rate of 100, no ST elevation or depression.


Differential Diagnosis


COPD exacerbation versus now found have versus sepsis versus influenza versus 

ACS


Narrative Course


Patient is a 77-year-old male who comes in due to shortness of breath and 

altered mental status.  He had an oxygen saturation of 83% in the waiting room.

  This improved on nasal cannula and with duo nebs.  Originally, patient was 

not moving much air.  After 3 duo nebs, he is moving air better, and now had 

coarse breath sounds and wheezing throughout his lungs.





He was given Solu-Medrol, a dose of Levaquin.





Chest x-ray shows possible right opacity.


Last 24 hours Impressions








Chest X-Ray 12/27/17 1506 Signed





Impressions: 





 Service Date/Time:  Wednesday, December 27, 2017 15:11 - CONCLUSION:  1. 

Changes 





 of obstructive pulmonary disease. 2. Minimal residual parenchymal opacity at 

the 





 extreme right lung base in the region of prior pleural-parenchymal disease. 

This 





 likely reflects scarring. 3. Otherwise, no acute abnormality.     Fernando Medina MD 





Patient given additional albuterol treatment.  He'll be admitted for further 

management.





Diagnosis





 Primary Impression:  


 COPD exacerbation


 Additional Impression:  


 Pneumonia


 Qualified Codes:  J18.1 - Lobar pneumonia, unspecified organism





Admitting Information


Admitting Physician Requests:  Admit











Rylie Regalado MD Dec 27, 2017 16:42

## 2017-12-28 NOTE — MH
cc:

RASHAD SHARMA MD

****

 

DATE OF ADMISSION

12/27/2017

 

CHIEF COMPLAINT

Shortness of breath, cough.

 

HISTORY OF PRESENT ILLNESS

This is a 77-year-old male with past medical/surgical history significant for

non-Hodgkin's lymphoma, anxiety, COPD, dementia, Miguel's esophagus, oxygen

dependent for COPD.  He is a smoker for many years who came to the ER at

[facility], brought by his neighbor due to altered mental status and

shortness of breath.  The neighbor stated that when he saw him yesterday he was

very confused and he came over to help, looking for his car.  The neighbor

stated that he had not eaten anything in a few days.  The neighbor tried to

take him from breakfast yesterday morning but the patient was very short of

breath so he brought him to the emergency department.  The patient has a

history of COPD and uses home oxygen.  He was found to have oxygen saturation

of 83% in the ER.  He stated he has been coughing.  Denied any fever, chills or

pain, and denied any other complaint.  When I examined the patient the patient

is awake, alert, oriented x3.  He knows his name and which hospital he is in

and what time of the day it is.  He is not short of breath at the time of

examination.  Other than that nothing significant.

 

PAST MEDICAL HISTORY

As dictated above.

 

PAST SURGICAL HISTORY

Nothing significant.

 

SOCIAL HISTORY

He smokes and drinks, lives at home alone.  Denies any drug abuse.  He is

retired.

 

FAMILY HISTORY

Nothing significant.

 

ALLERGIES

No known drug allergies.

 

MEDICATIONS

ProAir HFA 8.5 gram inhalation, two puff inhalation every 4-6 hours.

 

REVIEW OF SYSTEMS

Positive for mild shortness of breath and mild confusion.  All other review of

systems is negative.

 

PHYSICAL EXAMINATION

GENERAL:  This is a 77-year-old male sitting on the bed not in acute distress.

 

VITAL SIGNS:  Temperature 97.8, heart rate 69, respirations 20, blood pressure

118/74.  O2 saturation 96% on 2 liters nasal cannula.

HEENT:  Normocephalic, atraumatic.  EOMI.  PERRLA.  Oral mucosa moist.

NECK:  Supple.  No visible thyromegaly.  No neck mass.  Trachea is central.

CARDIOVASCULAR:  Regular rate and rhythm.

LUNGS:  Mild wheezing bilaterally.  Decreased air entry bilaterally.

ABDOMEN:  Soft and nontender.  Bowel sounds audible.

EXTREMITIES:  No cyanosis or clubbing.  Full range of motion of extremities.

NEUROLOGIC:  Awake, alert, oriented x3.  No focal neurological deficits.

SKIN:  Warm and dry.

PSYCHIATRIC:  The patient is cooperative.  Mood and affect is normal.

 

LABORATORY

CBC is unremarkable except for platelet count of 100 low.

 

CMP is unremarkable except for glucose 143 high, calcium 8.3 low.  LFTs are

normal.

 

PT 11.0, INR 1.1, APTT 28.9.

 

Influenza A and B negative.

 

IMAGING

Chest x-ray was done and shows changes of obstructive pulmonary disease,

minimal residual parenchymal opacity at the extreme right lung base in the

region of prior pleural parenchymal disease.  This likely reflects scarring.

Otherwise no acute abnormality.

 

ASSESSMENT AND PLAN

1. This is a 77-year-old male who came to the ER diagnosed with shortness of

   breath most likely secondary to COPD exacerbation.  The patient's oxygen

   saturation was 83% at the time of admission.  The patient is on Levaquin 500

   mg IV daily, albuterol/ipratropium nebulization every 4 hours and

   Solu-Medrol 40 mg IV q.6h.  The patient's shortness of breath is better.

2. Altered mental status.  The patient is awake, alert and oriented x3.  Will

   monitor the patient.  I will check metabolic work-up and get a CT scan of

   the brain without contrast.

3. History of COPD.  Continue home medication.

4. DVT prophylaxis.  Lovenox 40 mg subcutaneous daily.

5. GI prophylaxis.  Protonix 40 mg p.o. daily.

6. We are going to manage the patient on a daily basis and make recommendation

   on a daily basis.

 

 

 

                               __________________________________

                           Rashad Sharma MD EA/SANDI

D:  12/28/2017/10:42 AM

T:  12/28/2017/10:51 AM

Visit #:  [id number]

Job #:  [id number]

## 2017-12-28 NOTE — EKG
Date Performed: 12/27/2017       Time Performed: 15:32:38

 

PTAGE:      77 years

 

EKG:      SINUS TACHYCARDIA WITH OCCASIONAL ECTOPIC PREMATURE COMPLEXES LEFT ANTERIOR FASCICULAR BLOC
K ABNORMAL ECG

 

PREVIOUS TRACING       : 05/04/2017 17.21 Compared to prior tracing no significant change

 

DOCTOR:   Benigno Keating  Interpretating Date/Time  12/28/2017 23:16:40

## 2017-12-28 NOTE — RADRPT
EXAM DATE/TIME:  12/28/2017 15:19 

 

[facility] COMPARISON:     

CT BRAIN W/O CONTRAST, May 04, 2017, 19:31.

 

 

INDICATIONS :     

Altered mental status, confusion.

                      

 

RADIATION DOSE:     

62.08 CTDIvol (mGy) 

 

 

 

MEDICAL HISTORY :     

Dementia. Lymphoma. Chronic obstructive pulmonary disease.

 

SURGICAL HISTORY :      

None. 

 

ENCOUNTER:      

Initial

 

ACUITY:      

2 days

 

PAIN SCALE:      

0/10

 

LOCATION:        

cranial 

 

TECHNIQUE:     

Multiple contiguous axial images were obtained of the head.  Using automated exposure control and adj
ustment of the mA and/or kV according to patient size, radiation dose was kept as low as reasonably a
chievable to obtain optimal diagnostic quality images.   DICOM format image data is available electro
nically for review and comparison.  

 

FINDINGS:     

There is moderate to marked central and cortical atrophy with dilatation of ventricular and sulcal sp
aces.  There is no parenchymal hemorrhage, acute infarction or mass lesion identified. Significant hy
podensity is present throughout the cerebral white matter. There are no extra-axial fluid collections
 appreciated.  The posterior fossa is unremarkable with midline fourth ventricle.  The portion of the
 orbits and paranasal sinuses visualized are unremarkable. 

 

No significant change is noted since 5/4/2017

 

CONCLUSION:     

1. Cerebral and cerebellar atrophy

2. No evidence of acute infarct, hemorrhage, mass or edema.

3. No change since prior study.

 

 

 

 Dale Yusuf MD on December 28, 2017 at 17:10           

Board Certified Radiologist.

 This report was verified electronically.

## 2017-12-29 NOTE — HHI.PR
Subjective


History of Present Illness


Patient feel better SOB/ Cough/ wheezing better need to be discharged at 

nursing home.  no acute issue d/w RN. and . mild leukocytosis





Review of Systems


Pulmonary


Respiratory:  Coughing, Shortness of Breath, Wheezing





Vitals/Results


Intake & Output











 12/29/17 12/29/17 12/30/17





 15:00 23:00 07:00


 


Intake Total 750 ml  


 


Balance 750 ml  


 


   


 


Intake Oral 750 ml  


 


# Voids 3  


 


# Bowel Movements 0  








Vital Signs





Vital Signs








  Date Time  Temp Pulse Resp B/P (MAP) Pulse Ox O2 Delivery O2 Flow Rate FiO2


 


12/29/17 16:00 98.0 91 22 130/65 (86) 97   


 


12/29/17 12:00 97.8 89 20 117/59 (78) 97   


 


12/29/17 08:00     95 Nasal Cannula 2.00 


 


12/29/17 08:00 96.8 94 20 119/71 (87) 94   


 


12/29/17 07:25     97 Nasal Cannula 2.00 


 


12/29/17 04:17     95   


 


12/29/17 00:00 96.3 92 18 140/73 (95) 94   


 


12/28/17 21:00     95 Nasal Cannula 2.00 


 


12/28/17 20:00 96.5 83 18 131/64 (86) 95   


 


12/28/17 19:50     95 Nasal Cannula 2.00 








CBC/BMP:  


12/29/17 0607                                                                  

              12/29/17 0607





Lab Results





Laboratory Tests








Test


  12/29/17


06:07


 


White Blood Count 13.4 TH/MM3 


 


Red Blood Count 4.72 MIL/MM3 


 


Hemoglobin 14.2 GM/DL 


 


Hematocrit 42.2 % 


 


Mean Corpuscular Volume 89.3 FL 


 


Mean Corpuscular Hemoglobin 30.1 PG 


 


Mean Corpuscular Hemoglobin


Concent 33.8 % 


 


 


Red Cell Distribution Width 13.0 % 


 


Platelet Count 111 TH/MM3 


 


Mean Platelet Volume 9.3 FL 


 


Neutrophils (%) (Auto) 71.4 % 


 


Lymphocytes (%) (Auto) 23.7 % 


 


Monocytes (%) (Auto) 4.7 % 


 


Eosinophils (%) (Auto) 0.0 % 


 


Basophils (%) (Auto) 0.2 % 


 


Neutrophils # (Auto) 9.6 TH/MM3 


 


Lymphocytes # (Auto) 3.2 TH/MM3 


 


Monocytes # (Auto) 0.6 TH/MM3 


 


Eosinophils # (Auto) 0.0 TH/MM3 


 


Basophils # (Auto) 0.0 TH/MM3 


 


CBC Comment DIFF FINAL 


 


Differential Comment  


 


Blood Urea Nitrogen 15 MG/DL 


 


Creatinine 0.48 MG/DL 


 


Random Glucose 140 MG/DL 


 


Total Protein 5.9 GM/DL 


 


Albumin 3.3 GM/DL 


 


Calcium Level 8.7 MG/DL 


 


Alkaline Phosphatase 78 U/L 


 


Aspartate Amino Transf


(AST/SGOT) 19 U/L 


 


 


Alanine Aminotransferase


(ALT/SGPT) 21 U/L 


 


 


Total Bilirubin 0.3 MG/DL 


 


Sodium Level 140 MEQ/L 


 


Potassium Level 3.6 MEQ/L 


 


Chloride Level 105 MEQ/L 


 


Carbon Dioxide Level 28.5 MEQ/L 


 


Anion Gap 7 MEQ/L 


 


Estimat Glomerular Filtration


Rate 168 ML/MIN 


 











Physical Exam


General


General Appearance:  Well Developed, Well Nourished, No Acute Distress, 

Comfortable





Eyes


Eye Exam:  Pupils Equal, Pupils Reactive, Sclera White, Extraocular Movement 

Intact





Throat


Throat Exam:  Oral Mucosa Pink & Moist, Oral Pharynx Normal





Pulmonary


Resp Exam:  Decreased Bases, Diminished Breath Sounds


Resp Remarks


Bilateral wheezing





Cardiology


CV Exam:  Regular, Normal Sinus Rhythm





Gastrointestinal/Abdomen


GI Exam:  Soft, Non-Tender, Bowel Sounds Present





Musculoskeletal


MS Exam:  Joints Intact





Integumentary


Skin Exam:  Clear, Warm, Dry, Intact





Extremeties


Extremities Exam:  No Edema





Neurologic


Neuro Exam:  Alert, Awake, Oriented, Speech Clear


Neuro Remarks


dementia.





Psychiatric


Psych Exam:  Appropriate Responses





VTE Prophylaxis


VTE Prophylaxis Meds:  Lovenox





PUD Prophylasis


PUD Prophylaxis:  Protonix





Assessment/Plan


Assessment/Plan


ASSESSMENT AND PLAN 


This is a 77-year-old male who came to the ER diagnosed with


1.  shortness of  breath most likely secondary to COPD exacerbation.  The 

patient's oxygen


   saturation was 83% at the time of admission.  The patient is on Levaquin 500


   mg IV daily, albuterol/ipratropium nebulization every 4 hours and


   Solu-Medrol 40 mg IV q.6h.  The patient's shortness of breath is better.


2. Altered mental status.  The patient is awake, alert and oriented x3.  Will


   monitor the patient.   checked metabolic work-up and get a CT scan of


   the brain without contrast... Noted started on B12 tablet. 


3. History of COPD.  Continue home medication.


4. DVT prophylaxis.  Lovenox 40 mg subcutaneous daily.


5. GI prophylaxis.  Protonix 40 mg p.o. daily.


6. H/O  Dementia 


7. Mild Leukocytosis will monitor.


Check CBC with diff CMP in AM. 


We are going to manage the patient on a daily basis and make recommendation


   on a daily basis.


Discussed Condition with:  Patient











Rashad Ferris MD Dec 29, 2017 17:37

## 2017-12-30 NOTE — HHI.PR
Subjective


History of Present Illness


Patient feel better SOB/ Cough/ wheezing better need to be discharged at 

nursing home.  no acute issue  mild leukocytosis





Review of Systems


Pulmonary


Respiratory:  Coughing, Shortness of Breath, Wheezing





Vitals/Results


Vital Signs





Vital Signs








  Date Time  Temp Pulse Resp B/P (MAP) Pulse Ox O2 Delivery O2 Flow Rate FiO2


 


12/30/17 11:50 96.2 97 20 122/68 (86) 96   


 


12/30/17 08:51     96 Nasal Cannula 2.00 


 


12/30/17 07:50 96.3 93 20 134/70 (91) 96   


 


12/30/17 07:20     97 Nasal Cannula 2.00 


 


12/30/17 04:04   18  95   


 


12/30/17 00:00 96.4 92 18 161/80 (107) 96   


 


12/29/17 21:21     98 Nasal Cannula 2.00 


 


12/29/17 20:00 96.5 102 18 129/65 (86) 98   


 


12/29/17 19:35     97 Nasal Cannula 2.00 


 


12/29/17 16:00 98.0 91 22 130/65 (86) 97   








CBC/BMP:  


12/30/17 0608                                                                  

              12/30/17 0608





Lab Results





Laboratory Tests








Test


  12/30/17


06:08


 


White Blood Count 13.5 TH/MM3 


 


Red Blood Count 5.18 MIL/MM3 


 


Hemoglobin 14.9 GM/DL 


 


Hematocrit 47.1 % 


 


Mean Corpuscular Volume 90.9 FL 


 


Mean Corpuscular Hemoglobin 28.8 PG 


 


Mean Corpuscular Hemoglobin


Concent 31.7 % 


 


 


Red Cell Distribution Width 13.3 % 


 


Platelet Count 136 TH/MM3 


 


Mean Platelet Volume 10.5 FL 


 


Neutrophils (%) (Auto) 69.6 % 


 


Lymphocytes (%) (Auto) 25.0 % 


 


Monocytes (%) (Auto) 5.0 % 


 


Eosinophils (%) (Auto) 0.0 % 


 


Basophils (%) (Auto) 0.4 % 


 


Neutrophils # (Auto) 9.3 TH/MM3 


 


Lymphocytes # (Auto) 3.4 TH/MM3 


 


Monocytes # (Auto) 0.7 TH/MM3 


 


Eosinophils # (Auto) 0.0 TH/MM3 


 


Basophils # (Auto) 0.1 TH/MM3 


 


CBC Comment DIFF FINAL 


 


Differential Comment  


 


Blood Urea Nitrogen 19 MG/DL 


 


Creatinine 0.57 MG/DL 


 


Random Glucose 123 MG/DL 


 


Total Protein 6.6 GM/DL 


 


Albumin 3.6 GM/DL 


 


Calcium Level 9.3 MG/DL 


 


Alkaline Phosphatase 89 U/L 


 


Aspartate Amino Transf


(AST/SGOT) 18 U/L 


 


 


Alanine Aminotransferase


(ALT/SGPT) 22 U/L 


 


 


Total Bilirubin 0.4 MG/DL 


 


Sodium Level 138 MEQ/L 


 


Potassium Level 4.4 MEQ/L 


 


Chloride Level 99 MEQ/L 


 


Carbon Dioxide Level 32.7 MEQ/L 


 


Anion Gap 6 MEQ/L 


 


Estimat Glomerular Filtration


Rate 138 ML/MIN 


 











Physical Exam


General


General Appearance:  Well Developed, Well Nourished, No Acute Distress, 

Comfortable





Eyes


Eye Exam:  Pupils Equal, Pupils Reactive, Sclera White, Extraocular Movement 

Intact





Throat


Throat Exam:  Oral Mucosa Pink & Moist, Oral Pharynx Normal





Pulmonary


Resp Exam:  Decreased Bases, Diminished Breath Sounds


Resp Remarks


Bilateral wheezing





Cardiology


CV Exam:  Regular, Normal Sinus Rhythm





Gastrointestinal/Abdomen


GI Exam:  Soft, Non-Tender, Bowel Sounds Present





Musculoskeletal


MS Exam:  Joints Intact





Integumentary


Skin Exam:  Clear, Warm, Dry, Intact





Extremeties


Extremities Exam:  No Edema





Neurologic


Neuro Exam:  Alert, Awake, Oriented, Speech Clear


Neuro Remarks


dementia.





Psychiatric


Psych Exam:  Appropriate Responses





VTE Prophylaxis


VTE Prophylaxis Meds:  Lovenox





PUD Prophylasis


PUD Prophylaxis:  Protonix





Assessment/Plan


Assessment/Plan


ASSESSMENT AND PLAN 


This is a 77-year-old male who came to the ER diagnosed with


1.  shortness of  breath most likely secondary to COPD exacerbation.  The 

patient's oxygen


   saturation was 83% at the time of admission.  The patient is on Levaquin 500


   mg IV daily, albuterol/ipratropium nebulization every 4 hours and


   Solu-Medrol 40 mg IV q.6h.  The patient's shortness of breath is better.


2. Altered mental status.  The patient is awake, alert and oriented x3.  Will


   monitor the patient.   checked metabolic work-up and get a CT scan of


   the brain without contrast... Noted started on B12 tablet. 


3. History of COPD.  Continue home medication.


4. DVT prophylaxis.  Lovenox 40 mg subcutaneous daily.


5. GI prophylaxis.  Protonix 40 mg p.o. daily.


6. H/O  Dementia 


7. Mild Leukocytosis will monitor.


Check CBC with diff CMP in AM. 


We are going to manage the patient on a daily basis and make recommendation


   on a daily basis.


Discussed Condition with:  Patient











Rashad Ferris MD Dec 30, 2017 13:22

## 2017-12-31 NOTE — HHI.PR
Subjective


History of Present Illness


Patient feel better SOB/ Cough/ wheezing better need to be discharged at 

nursing home.  no acute issue  mild leukocytosis better ok to discharge to SNF 

in AM.





Review of Systems


Pulmonary


Respiratory:  Coughing, Shortness of Breath, Wheezing





Vitals/Results


Intake & Output











 12/31/17 12/31/17 1/1/18





 15:00 23:00 07:00


 


Intake Total 220 ml  


 


Output Total  375 ml 


 


Balance 220 ml -375 ml 


 


   


 


Intake Oral 220 ml  


 


Output Urine Total  375 ml 


 


# Bowel Movements  0 








Vital Signs





Vital Signs








  Date Time  Temp Pulse Resp B/P (MAP) Pulse Ox O2 Delivery O2 Flow Rate FiO2


 


12/31/17 19:08     97 Nasal Cannula 1.00 


 


12/31/17 16:00 96.7 97 14 115/74 (88) 95   


 


12/31/17 12:00 96.5 102 14 113/69 (84) 98   


 


12/31/17 08:00 96.9 87 12 141/81 (101) 92   


 


12/31/17 08:00     95 Nasal Cannula 1.00 


 


12/31/17 07:59     97 Nasal Cannula 2.00 


 


12/31/17 00:00 97.6 86 22 141/81 (101) 96   


 


12/30/17 20:00     96 Nasal Cannula 2.00 


 


12/30/17 20:00 97.5 97 20 136/76 (96) 96   


 


12/30/17 19:37     97 Nasal Cannula 2.00 








CBC/BMP:  


12/31/17 0644                                                                  

              12/31/17 0644





Lab Results





Laboratory Tests








Test


  12/31/17


06:44


 


White Blood Count 12.4 TH/MM3 


 


Red Blood Count 5.05 MIL/MM3 


 


Hemoglobin 14.6 GM/DL 


 


Hematocrit 45.4 % 


 


Mean Corpuscular Volume 89.8 FL 


 


Mean Corpuscular Hemoglobin 29.0 PG 


 


Mean Corpuscular Hemoglobin


Concent 32.3 % 


 


 


Red Cell Distribution Width 13.3 % 


 


Platelet Count 126 TH/MM3 


 


Mean Platelet Volume 10.1 FL 


 


Neutrophils (%) (Auto) 58.8 % 


 


Lymphocytes (%) (Auto) 34.2 % 


 


Monocytes (%) (Auto) 6.2 % 


 


Eosinophils (%) (Auto) 0.1 % 


 


Basophils (%) (Auto) 0.7 % 


 


Neutrophils # (Auto) 7.2 TH/MM3 


 


Lymphocytes # (Auto) 4.3 TH/MM3 


 


Monocytes # (Auto) 0.8 TH/MM3 


 


Eosinophils # (Auto) 0.0 TH/MM3 


 


Basophils # (Auto) 0.1 TH/MM3 


 


CBC Comment DIFF FINAL 


 


Differential Comment  


 


Blood Urea Nitrogen 22 MG/DL 


 


Creatinine 0.56 MG/DL 


 


Random Glucose 116 MG/DL 


 


Total Protein 6.3 GM/DL 


 


Albumin 3.3 GM/DL 


 


Calcium Level 9.6 MG/DL 


 


Alkaline Phosphatase 80 U/L 


 


Aspartate Amino Transf


(AST/SGOT) 15 U/L 


 


 


Alanine Aminotransferase


(ALT/SGPT) 23 U/L 


 


 


Total Bilirubin 0.5 MG/DL 


 


Sodium Level 138 MEQ/L 


 


Potassium Level 3.9 MEQ/L 


 


Chloride Level 100 MEQ/L 


 


Carbon Dioxide Level 30.8 MEQ/L 


 


Anion Gap 7 MEQ/L 


 


Estimat Glomerular Filtration


Rate 141 ML/MIN 


 











Physical Exam


General


General Appearance:  Well Developed, Well Nourished, No Acute Distress, 

Comfortable





Eyes


Eye Exam:  Pupils Equal, Pupils Reactive, Sclera White, Extraocular Movement 

Intact





Throat


Throat Exam:  Oral Mucosa Pink & Moist, Oral Pharynx Normal





Pulmonary


Resp Exam:  Decreased Bases, Diminished Breath Sounds


Resp Remarks


Bilateral wheezing





Cardiology


CV Exam:  Regular, Normal Sinus Rhythm





Gastrointestinal/Abdomen


GI Exam:  Soft, Non-Tender, Bowel Sounds Present





Musculoskeletal


MS Exam:  Joints Intact





Integumentary


Skin Exam:  Clear, Warm, Dry, Intact





Extremeties


Extremities Exam:  No Edema





Neurologic


Neuro Exam:  Alert, Awake, Oriented, Speech Clear


Neuro Remarks


dementia.





Psychiatric


Psych Exam:  Appropriate Responses





VTE Prophylaxis


VTE Prophylaxis Meds:  Lovenox





PUD Prophylasis


PUD Prophylaxis:  Protonix





Assessment/Plan


Assessment/Plan


ASSESSMENT AND PLAN 


This is a 77-year-old male who came to the ER diagnosed with


1.  shortness of  breath most likely secondary to COPD exacerbation.  The 

patient's oxygen


   saturation was 83% at the time of admission.  The patient is on Levaquin 500


   mg IV daily, albuterol/ipratropium nebulization every 4 hours and


   Solu-Medrol 40 mg IV q.6h.  The patient's shortness of breath is better.


2. Altered mental status.  The patient is awake, alert and oriented x3.  Will


   monitor the patient.   checked metabolic work-up and get a CT scan of


   the brain without contrast... Noted started on B12 tablet. 


3. History of COPD.  Continue home medication.


4. DVT prophylaxis.  Lovenox 40 mg subcutaneous daily.


5. GI prophylaxis.  Protonix 40 mg p.o. daily.


6. H/O  Dementia 


7. Mild Leukocytosis will monitor. better.


ok to discharge to SNF in AM. 


f/u with PCP 1 week.


Discussed Condition with:  Patient











Rashad Ferris MD Dec 31, 2017 19:31

## 2018-06-11 ENCOUNTER — HOSPITAL ENCOUNTER (OUTPATIENT)
Dept: HOSPITAL 17 - HRSP | Age: 78
End: 2018-06-11
Attending: INTERNAL MEDICINE
Payer: MEDICARE

## 2018-06-11 DIAGNOSIS — R06.02: Primary | ICD-10-CM

## 2018-06-11 PROCEDURE — 94618 PULMONARY STRESS TESTING: CPT

## 2018-06-22 ENCOUNTER — HOSPITAL ENCOUNTER (OUTPATIENT)
Dept: HOSPITAL 17 - PHED | Age: 78
Setting detail: OBSERVATION
LOS: 1 days | Discharge: HOME | End: 2018-06-23
Attending: HOSPITALIST | Admitting: HOSPITALIST
Payer: MEDICARE

## 2018-06-22 VITALS — BODY MASS INDEX: 17.32 KG/M2 | WEIGHT: 127.87 LBS | HEIGHT: 72 IN

## 2018-06-22 VITALS
TEMPERATURE: 97.7 F | DIASTOLIC BLOOD PRESSURE: 56 MMHG | OXYGEN SATURATION: 96 % | SYSTOLIC BLOOD PRESSURE: 107 MMHG | HEART RATE: 120 BPM | RESPIRATION RATE: 18 BRPM

## 2018-06-22 VITALS
TEMPERATURE: 97 F | SYSTOLIC BLOOD PRESSURE: 110 MMHG | OXYGEN SATURATION: 94 % | DIASTOLIC BLOOD PRESSURE: 68 MMHG | HEART RATE: 80 BPM | RESPIRATION RATE: 16 BRPM

## 2018-06-22 VITALS
HEART RATE: 117 BPM | DIASTOLIC BLOOD PRESSURE: 84 MMHG | OXYGEN SATURATION: 98 % | SYSTOLIC BLOOD PRESSURE: 128 MMHG | TEMPERATURE: 98.6 F | RESPIRATION RATE: 18 BRPM

## 2018-06-22 VITALS — HEART RATE: 136 BPM | DIASTOLIC BLOOD PRESSURE: 77 MMHG | SYSTOLIC BLOOD PRESSURE: 117 MMHG

## 2018-06-22 VITALS
SYSTOLIC BLOOD PRESSURE: 108 MMHG | RESPIRATION RATE: 18 BRPM | OXYGEN SATURATION: 96 % | DIASTOLIC BLOOD PRESSURE: 68 MMHG | HEART RATE: 80 BPM | TEMPERATURE: 96.2 F

## 2018-06-22 VITALS — HEART RATE: 149 BPM | DIASTOLIC BLOOD PRESSURE: 73 MMHG | SYSTOLIC BLOOD PRESSURE: 122 MMHG

## 2018-06-22 VITALS
SYSTOLIC BLOOD PRESSURE: 118 MMHG | DIASTOLIC BLOOD PRESSURE: 77 MMHG | TEMPERATURE: 96.7 F | RESPIRATION RATE: 18 BRPM | HEART RATE: 61 BPM | OXYGEN SATURATION: 97 %

## 2018-06-22 VITALS
RESPIRATION RATE: 18 BRPM | SYSTOLIC BLOOD PRESSURE: 171 MMHG | DIASTOLIC BLOOD PRESSURE: 95 MMHG | OXYGEN SATURATION: 97 % | HEART RATE: 135 BPM

## 2018-06-22 VITALS — OXYGEN SATURATION: 98 %

## 2018-06-22 VITALS
SYSTOLIC BLOOD PRESSURE: 123 MMHG | RESPIRATION RATE: 16 BRPM | HEART RATE: 112 BPM | OXYGEN SATURATION: 99 % | DIASTOLIC BLOOD PRESSURE: 64 MMHG

## 2018-06-22 VITALS
SYSTOLIC BLOOD PRESSURE: 128 MMHG | OXYGEN SATURATION: 99 % | RESPIRATION RATE: 16 BRPM | HEART RATE: 104 BPM | DIASTOLIC BLOOD PRESSURE: 74 MMHG

## 2018-06-22 VITALS — OXYGEN SATURATION: 99 %

## 2018-06-22 VITALS — DIASTOLIC BLOOD PRESSURE: 70 MMHG | SYSTOLIC BLOOD PRESSURE: 112 MMHG | HEART RATE: 103 BPM

## 2018-06-22 VITALS — OXYGEN SATURATION: 97 %

## 2018-06-22 DIAGNOSIS — Z85.72: ICD-10-CM

## 2018-06-22 DIAGNOSIS — F17.210: ICD-10-CM

## 2018-06-22 DIAGNOSIS — F10.10: ICD-10-CM

## 2018-06-22 DIAGNOSIS — R06.02: ICD-10-CM

## 2018-06-22 DIAGNOSIS — F03.90: ICD-10-CM

## 2018-06-22 DIAGNOSIS — J44.1: ICD-10-CM

## 2018-06-22 DIAGNOSIS — I48.91: Primary | ICD-10-CM

## 2018-06-22 DIAGNOSIS — H91.90: ICD-10-CM

## 2018-06-22 DIAGNOSIS — Z99.81: ICD-10-CM

## 2018-06-22 DIAGNOSIS — F41.9: ICD-10-CM

## 2018-06-22 DIAGNOSIS — K22.70: ICD-10-CM

## 2018-06-22 LAB
ALBUMIN SERPL-MCNC: 4 GM/DL (ref 3.4–5)
ALP SERPL-CCNC: 94 U/L (ref 45–117)
ALT SERPL-CCNC: 21 U/L (ref 12–78)
AST SERPL-CCNC: 16 U/L (ref 15–37)
BASOPHILS # BLD AUTO: 0.3 TH/MM3 (ref 0–0.2)
BASOPHILS NFR BLD AUTO: 1 % (ref 0–2)
BASOPHILS NFR BLD: 2.5 % (ref 0–2)
BILIRUB SERPL-MCNC: 0.4 MG/DL (ref 0.2–1)
BUN SERPL-MCNC: 17 MG/DL (ref 7–18)
CALCIUM SERPL-MCNC: 8.4 MG/DL (ref 8.5–10.1)
CHLORIDE SERPL-SCNC: 107 MEQ/L (ref 98–107)
COLOR UR: YELLOW
CREAT SERPL-MCNC: 0.7 MG/DL (ref 0.6–1.3)
DACRYOCYTES BLD QL SMEAR: (no result)
EOSINOPHIL # BLD: 0.2 TH/MM3 (ref 0–0.4)
EOSINOPHIL NFR BLD: 1.9 % (ref 0–4)
ERYTHROCYTE [DISTWIDTH] IN BLOOD BY AUTOMATED COUNT: 14.2 % (ref 11.6–17.2)
GFR SERPLBLD BASED ON 1.73 SQ M-ARVRAT: 109 ML/MIN (ref 89–?)
GLUCOSE SERPL-MCNC: 106 MG/DL (ref 74–106)
GLUCOSE UR STRIP-MCNC: (no result) MG/DL
HCO3 BLD-SCNC: 29.6 MEQ/L (ref 21–32)
HCT VFR BLD CALC: 43.1 % (ref 39–51)
HGB BLD-MCNC: 14 GM/DL (ref 13–17)
HGB UR QL STRIP: (no result)
INR PPP: 1.1 RATIO
KETONES UR STRIP-MCNC: (no result) MG/DL
LEUKOCYTE ESTERASE UR QL STRIP: (no result) /HPF (ref 0–5)
LYMPHOCYTES # BLD AUTO: 6.6 TH/MM3 (ref 1–4.8)
LYMPHOCYTES NFR BLD AUTO: 55.7 % (ref 9–44)
LYMPHOCYTES: 62 % (ref 9–44)
MCH RBC QN AUTO: 29.1 PG (ref 27–34)
MCHC RBC AUTO-ENTMCNC: 32.5 % (ref 32–36)
MCV RBC AUTO: 89.4 FL (ref 80–100)
MONOCYTE #: 0.8 TH/MM3 (ref 0–0.9)
MONOCYTES NFR BLD: 6.4 % (ref 0–8)
MONOCYTES: 3 % (ref 0–8)
NEUTROPHILS # BLD AUTO: 4 TH/MM3 (ref 1.8–7.7)
NEUTROPHILS NFR BLD AUTO: 33.5 % (ref 16–70)
NEUTS BAND # BLD MANUAL: 3.9 TH/MM3 (ref 1.8–7.7)
NEUTS SEG NFR BLD MANUAL: 33 % (ref 16–70)
NITRITE UR QL STRIP: (no result)
OVALOCYTES BLD QL SMEAR: (no result)
PLATELET # BLD: 135 TH/MM3 (ref 150–450)
PMV BLD AUTO: 10.1 FL (ref 7–11)
PROT SERPL-MCNC: 6.8 GM/DL (ref 6.4–8.2)
PROTHROMBIN TIME: 10.7 SEC (ref 9.8–11.6)
RBC # BLD AUTO: 4.82 MIL/MM3 (ref 4.5–5.9)
SMUDGE CELLS BLD QL SMEAR: PRESENT
SODIUM SERPL-SCNC: 143 MEQ/L (ref 136–145)
SP GR UR STRIP: 1.02 (ref 1–1.03)
SQUAMOUS #/AREA URNS HPF: (no result) /HPF (ref 0–5)
TROPONIN I SERPL-MCNC: (no result) NG/ML (ref 0.02–0.05)
URINE LEUKOCYTE ESTERASE: (no result)
WBC # BLD AUTO: 11.9 TH/MM3 (ref 4–11)

## 2018-06-22 PROCEDURE — 83880 ASSAY OF NATRIURETIC PEPTIDE: CPT

## 2018-06-22 PROCEDURE — 85730 THROMBOPLASTIN TIME PARTIAL: CPT

## 2018-06-22 PROCEDURE — G0378 HOSPITAL OBSERVATION PER HR: HCPCS

## 2018-06-22 PROCEDURE — 99285 EMERGENCY DEPT VISIT HI MDM: CPT

## 2018-06-22 PROCEDURE — 94664 DEMO&/EVAL PT USE INHALER: CPT

## 2018-06-22 PROCEDURE — 93005 ELECTROCARDIOGRAM TRACING: CPT

## 2018-06-22 PROCEDURE — 80053 COMPREHEN METABOLIC PANEL: CPT

## 2018-06-22 PROCEDURE — 81001 URINALYSIS AUTO W/SCOPE: CPT

## 2018-06-22 PROCEDURE — 71046 X-RAY EXAM CHEST 2 VIEWS: CPT

## 2018-06-22 PROCEDURE — 96375 TX/PRO/DX INJ NEW DRUG ADDON: CPT

## 2018-06-22 PROCEDURE — 84484 ASSAY OF TROPONIN QUANT: CPT

## 2018-06-22 PROCEDURE — 85025 COMPLETE CBC W/AUTO DIFF WBC: CPT

## 2018-06-22 PROCEDURE — 85027 COMPLETE CBC AUTOMATED: CPT

## 2018-06-22 PROCEDURE — 80048 BASIC METABOLIC PNL TOTAL CA: CPT

## 2018-06-22 PROCEDURE — 94640 AIRWAY INHALATION TREATMENT: CPT

## 2018-06-22 PROCEDURE — 96374 THER/PROPH/DIAG INJ IV PUSH: CPT

## 2018-06-22 PROCEDURE — 85007 BL SMEAR W/DIFF WBC COUNT: CPT

## 2018-06-22 PROCEDURE — 96372 THER/PROPH/DIAG INJ SC/IM: CPT

## 2018-06-22 PROCEDURE — 85610 PROTHROMBIN TIME: CPT

## 2018-06-22 RX ADMIN — Medication SCH ML: at 22:20

## 2018-06-22 RX ADMIN — IPRATROPIUM BROMIDE AND ALBUTEROL SULFATE SCH AMPULE: .5; 3 SOLUTION RESPIRATORY (INHALATION) at 04:13

## 2018-06-22 RX ADMIN — IPRATROPIUM BROMIDE SCH MG: 0.5 SOLUTION RESPIRATORY (INHALATION) at 19:28

## 2018-06-22 RX ADMIN — METOPROLOL TARTRATE SCH MG: 50 TABLET, FILM COATED ORAL at 16:20

## 2018-06-22 RX ADMIN — APIXABAN SCH MG: 2.5 TABLET, FILM COATED ORAL at 22:20

## 2018-06-22 RX ADMIN — BUDESONIDE AND FORMOTEROL FUMARATE DIHYDRATE SCH PUFF: 160; 4.5 AEROSOL RESPIRATORY (INHALATION) at 22:20

## 2018-06-22 RX ADMIN — IPRATROPIUM BROMIDE AND ALBUTEROL SULFATE SCH AMPULE: .5; 3 SOLUTION RESPIRATORY (INHALATION) at 04:35

## 2018-06-22 RX ADMIN — IPRATROPIUM BROMIDE AND ALBUTEROL SULFATE SCH AMPULE: .5; 3 SOLUTION RESPIRATORY (INHALATION) at 04:24

## 2018-06-22 RX ADMIN — METOPROLOL TARTRATE SCH MG: 50 TABLET, FILM COATED ORAL at 22:20

## 2018-06-22 RX ADMIN — IPRATROPIUM BROMIDE SCH MG: 0.5 SOLUTION RESPIRATORY (INHALATION) at 15:47

## 2018-06-22 RX ADMIN — IPRATROPIUM BROMIDE SCH MG: 0.5 SOLUTION RESPIRATORY (INHALATION) at 11:44

## 2018-06-22 RX ADMIN — Medication SCH ML: at 09:30

## 2018-06-22 RX ADMIN — IPRATROPIUM BROMIDE SCH MG: 0.5 SOLUTION RESPIRATORY (INHALATION) at 07:43

## 2018-06-22 NOTE — RADRPT
EXAM DATE:  6/22/2018 2:01 AM EDT

AGE/SEX:        77 years / Male



INDICATIONS:  Shortness of breath for 1 hour



CLINICAL DATA:  This is the patient's initial encounter. Patient reports that signs and symptoms have
 been present for 1 day and indicates a pain score of 0/10. 

                                                                          

MEDICAL/SURGICAL HISTORY:       . Chronic obstructive pulmonary disease. Miguel's esophagus. Non hod
gkin's lymphoma  None.



COMPARISON:      POI, XR CHEST PA AND LAT, 2/15/2018.  . 





FINDINGS:  

PA and lateral views the chest were obtained and again demonstrate mild hyperinflation. There is unde
rlying emphysema and mild scarring. No new infiltrates or effusions are present. The heart size remai
ns within normal limits. The bony structures are intact.



CONCLUSION: 

Underlying emphysema and scarring with no evidence of pneumonia.



Electronically signed by: Adolfo Merino MD  6/22/2018 2:13 AM EDT

## 2018-06-22 NOTE — HHI.HP
__________________________________________________





HPI


Service


[facility]


Primary Care Physician


Kar Dotson MD


Admission Diagnosis





No onset atrial fibrillation with RVR


Diagnoses:  


Chief Complaint:  


Shortness of breath, palpitation.


Travel History


International Travel<30 Days:  No


Contact w/Intl Traveler <30 Da:  No


Traveled to Known Affected Are:  No


History of Present Illness


Mr. Cano is a pleasant 77-year-old patient male with a history of COPD, 

dementia who presents to the emergency department on 2018 due to shortness 

of breath as well as palpitations.  In the ED, he was found to have atrial 

fibrillation with RVR.  Patient denies any chest pain, fever or chills.  He 

does report some cough in the last few days.  He denies any changes in bowel or 

bladder habits.  I discussed with his caregiver who reports that patient uses 3-

3.5 L of oxygen especially when he ambulates at home.  Discussed with patient's 

son as well who stated that patient drinks as well as a smoke cigarettes.





Review of Systems


Except as stated in HPI:  all other systems reviewed are Neg





Past Family Social History


Past Medical History


Anxiety, non-Hodgkin's lymphoma, Miguel's esophagus, COPD, dementia


Past Surgical History


No major surgeries in the past


Reported Medications


Vitamin B-12 (Cyanocobalamin) 1,000 Mcg Tab 1,000 Mcg PO DAILY


Oxygen tank (Oxygen) 1 Ea Tank 2 Liter [employer] CONTINUOUS


     Oxygen Concentrator Portable Gaseous


     2 L/min via Nasal Cannula Continuous


     For 99 months


Reported


Proair Hfa 8.5 GM Inh (Albuterol Sulfate) 90 Mcg/Act Aer 2 Puff INH Q4-6H PRN


     108 mcg/actuation


Allergies:  


Coded Allergies:  


     No Known Allergies (Unverified  Adverse Reaction, Unknown, 17)


Family History


No family history of heart disease or cancer.  No history of Alzheimer's or 

Parkinson's disease.


Social History


Patient smokes cigarettes and drinks alcohol.  Denies using illicit drugs.





Physical Exam


Vital Signs





Vital Signs








  Date Time  Temp Pulse Resp B/P (MAP) Pulse Ox O2 Delivery O2 Flow Rate FiO2


 


18 08:00        


 


18 07:45     99 Nasal Cannula 2.00 


 


18 06:26  112 16 123/64 (83) 99 Nasal Cannula 2.00 


 


18 05:50  103  112/70 (84)    


 


18 05:40  136  117/77 (90)    


 


18 05:35  149  122/73 (89)    


 


18 04:13     98 Nasal Cannula 2.00 


 


18 03:43  104 16 128/74 (92) 99 Nasal Cannula 2.00 


 


18 02:05 98.6 117 18 128/84 (99) 98  2.00 


 


18 01:30     98 Nasal Cannula 2.00 


 


18 01:23  135 18 171/95 (120) 97   








Physical Exam


GENERAL: This is a well-nourished, well-developed patient, in no apparent 

distress.


SKIN: No rashes, ecchymoses or lesions. Warm and dry.


HEAD: Atraumatic. Normocephalic. No temporal or scalp tenderness.


EYES: Pupils equal round and reactive. No injection or drainage. 


ENT: Nose without bleeding, purulent drainage or septal hematoma. Airway patent.


NECK: Trachea midline. No lymphadenopathy. Supple, nontender, no meningeal 

signs.


CARDIOVASCULAR: Regular rate and rhythm without murmurs, gallops, or rubs. No 

JVD. 


RESPIRATORY: Moderate air entry.  Somewhat coarse sound on the left upper lung 

fields.  No wheezing appreciated.


GASTROINTESTINAL: Abdomen soft, non-tender, nondistended. No guarding.


MUSCULOSKELETAL: Extremities without clubbing, cyanosis, or edema. 


NEUROLOGICAL: Awake and alert. Cranial nerves II through XII intact. No focal 

neurological deficits.  His speech is somewhat fragmented and intermittently 

incoherent.


Laboratory





Laboratory Tests








Test


  18


01:35 18


06:50


 


White Blood Count 11.9  


 


Red Blood Count 4.82  


 


Hemoglobin 14.0  


 


Hematocrit 43.1  


 


Mean Corpuscular Volume 89.4  


 


Mean Corpuscular Hemoglobin 29.1  


 


Mean Corpuscular Hemoglobin


Concent 32.5 


  


 


 


Red Cell Distribution Width 14.2  


 


Platelet Count 135  


 


Mean Platelet Volume 10.1  


 


Neutrophils (%) (Auto) 33.5  


 


Lymphocytes (%) (Auto) 55.7  


 


Monocytes (%) (Auto) 6.4  


 


Eosinophils (%) (Auto) 1.9  


 


Basophils (%) (Auto) 2.5  


 


Neutrophils # (Auto) 4.0  


 


Lymphocytes # (Auto) 6.6  


 


Monocytes # (Auto) 0.8  


 


Eosinophils # (Auto) 0.2  


 


Basophils # (Auto) 0.3  


 


CBC Comment AUTO DIFF  


 


Differential Total Cells


Counted 100 


  


 


 


Neutrophils % (Manual) 33  


 


Lymphocytes % 62  


 


Monocytes % 3  


 


Eosinophils % 1  


 


Basophils % 1  


 


Neutrophils # (Manual) 3.9  


 


Differential Comment


  FINAL DIFF


MANUAL 


 


 


Smudge Cells PRESENT  


 


Platelet Estimate LOW  


 


Platelet Morphology Comment NORMAL  


 


Tear Drop Cells 1+  


 


Ovalocytes 1+  


 


Urine Color YELLOW  


 


Urine Turbidity CLEAR  


 


Urine pH 5.5  


 


Urine Specific Gravity 1.020  


 


Urine Protein NEG  


 


Urine Glucose (UA) NEG  


 


Urine Ketones NEG  


 


Urine Occult Blood NEG  


 


Urine Nitrite NEG  


 


Urine Bilirubin NEG  


 


Urine Urobilinogen 0.2  


 


Urine Leukocyte Esterase NEG  


 


Urine WBC 0-2  


 


Urine Squamous Epithelial


Cells 0-5 


  


 


 


Microscopic Urinalysis Comment


  CULT NOT


INDICATED 


 


 


Blood Urea Nitrogen 17  


 


Creatinine 0.70  


 


Random Glucose 106  


 


Total Protein 6.8  


 


Albumin 4.0  


 


Calcium Level 8.4  


 


Alkaline Phosphatase 94  


 


Aspartate Amino Transf


(AST/SGOT) 16 


  


 


 


Alanine Aminotransferase


(ALT/SGPT) 21 


  


 


 


Total Bilirubin 0.4  


 


Sodium Level 143  


 


Potassium Level 3.6  


 


Chloride Level 107  


 


Carbon Dioxide Level 29.6  


 


Anion Gap 6  


 


Estimat Glomerular Filtration


Rate 109 


  


 


 


Troponin I LESS THAN 0.02  


 


B-Type Natriuretic Peptide 217  


 


Prothrombin Time  10.7 


 


Prothromb Time International


Ratio 


  1.1 


 


 


Activated Partial


Thromboplast Time 


  26.6 


 








Result Diagram:  


18 0135                                                                   

             18 0135





Imaging





Last Impressions








Chest X-Ray 18 0000 Signed





Impressions: 





 CONCLUSION: 





 Underlying emphysema and scarring with no evidence of pneumonia.





  





 











Caprini VTE Risk Assessment


Caprini VTE Risk Assessment:  Mod/High Risk (score >= 2)


Caprini Risk Assessment Model











 Point Value = 1          Point Value = 2  Point Value = 3  Point Value = 5


 


Age 41-60


Minor surgery


BMI > 25 kg/m2


Swollen legs


Varicose veins


Pregnancy or postpartum


History of unexplained or recurrent


   spontaneous 


Oral contraceptives or hormone


   replacement


Sepsis (< 1 month)


Serious lung disease, including


   pneumonia (< 1 month)


Abnormal pulmonary function


Acute myocardial infarction


Congestive heart failure (< 1 month)


History of inflammatory bowel disease


Medical patient at bed rest Age 61-74


Arthroscopic surgery


Major open surgery (> 45 min)


Laparoscopic surgery (> 45 min)


Malignancy


Confined to bed (> 72 hours)


Immobilizing plaster cast


Central venous access Age >= 75


History of VTE


Family history of VTE


Factor V Leiden


Prothrombin 65563B


Lupus anticoagulant


Anticardiolipin antibodies


Elevated serum homocysteine


Heparin-induced thrombocytopenia


Other congenital or acquired


   thrombophilia Stroke (< 1 month)


Elective arthroplasty


Hip, pelvis, or leg fracture


Acute spinal cord injury (< 1 month)








Prophylaxis Regimen











   Total Risk


Factor Score Risk Level Prophylaxis Regimen


 


0-1      Low Early ambulation


 


2 Moderate Order ONE of the following:


*Sequential Compression Device (SCD)


*Heparin 5000 units SQ BID


 


3-4 Higher Order ONE of the following medications:


*Heparin 5000 units SQ TID


*Enoxaparin/Lovenox 40 mg SQ daily (WT < 150 kg, CrCl > 30 mL/min)


*Enoxaparin/Lovenox 30 mg SQ daily (WT < 150 kg, CrCl > 10-29 mL/min)


*Enoxaparin/Lovenox 30 mg SQ BID (WT < 150 kg, CrCl > 30 mL/min)


AND/OR


*Sequential Compression Device (SCD)


 


5 or more Highest Order ONE of the following medications:


*Heparin 5000 units SQ TID (Preferred with Epidurals)


*Enoxaparin/Lovenox 40 mg SQ daily (WT < 150 kg, CrCl > 30 mL/min)


*Enoxaparin/Lovenox 30 mg SQ daily (WT < 150 kg, CrCl > 10-29 mL/min)


*Enoxaparin/Lovenox 30 mg SQ BID (WT < 150 kg, CrCl > 30 mL/min)


AND


*Sequential Compression Device (SCD)











Assessment and Plan


Problem List:  


(1) Atrial fibrillation, new onset


ICD Code:  I48.91 - Unspecified atrial fibrillation


Status:  Acute


(2) COPD with acute exacerbation


ICD Code:  J44.1 - Chronic obstructive pulmonary disease with (acute) 

exacerbation


Status:  Acute


Assessment and Plan


Mr. Cano is a 77-year-old  male with a history of dementia, COPD 

who presents to the emergency department on 2018 due to shortness of breath

, a feeling of palpitation.  In the ED he was found to have atrial fibrillation 

with RVR.





Acute COPD exacerbation


   -We will continue supplemental oxygen to keep O2 saturation around 90%.


   -Breathing treatment with ipratropium


   -Start Symbicort twice a day.





Atrial fibrillation


   -[id number] score 2 (age over 75)


   -I discussed with patient's son regarding anticoagulation.  


   -Will start patient on apixaban 2.5 mg twice daily - patient is close to 78 

years old and his weight is 58.1 kg.


   -Metoprolol 25 mg every 8 hours for rate control.  If needed we will add 

calcium channel blocker.





Tobacco abuse


Alcohol abuse


   -Counseled patient regarding alcohol and tobacco abuse.





Full code.  Apixaban 2.5 mg twice daily.











Yohana Ferris DO 2018 09:06

## 2018-06-22 NOTE — EKG
Date Performed: 06/22/2018       Time Performed: 01:26:23

 

PTAGE:      77 years

 

EKG:      ATRIAL FIBRILLATION WITH RAPID VENTRICULAR RESPONSE LOW QRS VOLTAGE IN EXTREMITY LEADS LEFT
 ANTERIOR FASCICULAR BLOCK MINIMAL ST DEPRESSION ABNORMAL ECG

 

PREVIOUS TRACING        12/27/17 Compared to the prior study, atrial fibrillation with rapid ventricu
lar response has replaced sinus tachycardia.

 

DOCTOR:   Neal Smiley  Interpretating Date/Time  06/22/2018 09:52:08

## 2018-06-22 NOTE — PD
HPI


Chief Complaint:  Respiratory Symptoms


Time Seen by Provider:  03:54


Travel History


International Travel<30 days:  No


Contact w/Intl Traveler<30days:  No


Traveled to known affect area:  No





History of Present Illness


HPI


The patient is a 77-year-old male that states he got short of breath tonight 

and panicked and called the ambulance to come to the emergency department.  He 

has a history of COPD.  In route he was given 125 of Solu-Medrol one DuoNeb 

treatment.  He felt better after the DuoNeb treatment.  Apparently, the patient 

does have a history of anxiety and dementia and is oxygen dependent on a as 

needed basis.





PFSH


Past Medical History


Anxiety:  Yes


Depression:  No


Cancer:  Yes (hx of non-hodgkin lymphoma)


Cardiovascular Problems:  No


COPD:  Yes


Dementia:  Yes


Diminished Hearing:  Yes


Endocrine:  No


Gastrointestinal Disorders:  Yes (barretts esophagus)


Genitourinary:  No


Immune Disorder:  No


Musculoskeletal:  No


Neurologic:  No


Psychiatric:  No


Reproductive:  No


Respiratory:  Yes (copd oxygen dependant)


Immunizations Current:  Yes


Tetanus Vaccination:  > 5 Years


Influenza Vaccination:  Yes





Past Surgical History


Other Surgery:  No





Social History


Alcohol Use:  Yes


Tobacco Use:  Yes


Substance Use:  No





Allergies-Medications


(Allergen,Severity, Reaction):  


Coded Allergies:  


     No Known Allergies (Unverified  Adverse Reaction, Unknown, 12/27/17)


Reported Meds & Prescriptions





Reported Meds & Active Scripts


Active


Levaquin (Levofloxacin) 500 Mg Tablet 500 Mg PO DAILY 7 Days


Vitamin B-12 (Cyanocobalamin) 1,000 Mcg Tab 1,000 Mcg PO DAILY


Oxygen tank (Oxygen) 1 Ea Tank 2 Liter CINDA[employer] CONTINUOUS


     Oxygen Concentrator Portable Gaseous


     2 L/min via Nasal Cannula Continuous


     For 99 months


Reported


Proair Hfa 8.5 GM Inh (Albuterol Sulfate) 90 Mcg/Act Aer 2 Puff INH Q4-6H PRN


     108 mcg/actuation








Review of Systems


Except as stated in HPI:  all other systems reviewed are Neg





Physical Exam


Narrative


GENERAL: The patient when I encountered him was asleep, he was feeling much 

better but is a poor historian and has a hard time answering my questions 

regarding history.  His blood pressure 171/95 with heart rate of 135 and 

oximetry 97% respirations 18.


SKIN: Focused skin assessment warm/dry.


HEAD: Atraumatic. Normocephalic. 


EYES: Pupils equal and round. No scleral icterus. No injection or drainage. 


ENT: No nasal bleeding or discharge.  Mucous membranes pink and moist.


NECK: Trachea midline. No JVD. 


CARDIOVASCULAR: Tachycardia rhythm.  No murmur appreciated.


RESPIRATORY: No accessory muscle use.  Breath sounds are diminished with a few 

wheezes present. Breath sounds equal bilaterally. 


GASTROINTESTINAL: Abdomen soft, non-tender, nondistended. Hepatic and splenic 

margins not palpable. 


MUSCULOSKELETAL: No obvious deformities. No clubbing.  No cyanosis.  No edema. 


NEUROLOGICAL: Awake and alert. No obvious cranial nerve deficits.  Motor 

grossly within normal limits. Normal speech.


PSYCHIATRIC: Appropriate mood and affect; insight and judgment normal.





Data


Data


Last Documented VS





Vital Signs








  Date Time  Temp Pulse Resp B/P (MAP) Pulse Ox O2 Delivery O2 Flow Rate FiO2


 


6/22/18 04:13     98 Nasal Cannula 2.00 


 


6/22/18 03:43  104 16 128/74 (92)    


 


6/22/18 02:05 98.6       








Orders





 Orders


Complete Blood Count With Diff (6/22/18 01:39)


Comprehensive Metabolic Panel (6/22/18 01:39)


B-Type Natriuretic Peptide (6/22/18 01:39)


Troponin I (6/22/18 01:39)


Chest, Pa & Lat (6/22/18 )


Urinalysis - C+S If Indicated (6/22/18 01:39)


Electrocardiogram (6/22/18 03:56)


Troponin I (6/22/18 03:56)


B-Type Natriuretic Peptide (6/22/18 03:57)


Sodium Chloride 0.9% Flush (Ns Flush) (6/22/18 04:00)


Albuterol-Ipratropium Neb (Duoneb Neb) (6/22/18 04:00)


Prothrombin Time / Inr (Pt) (6/22/18 05:02)


Act Partial Throm Time (Ptt) (6/22/18 05:02)


Place In Observation (6/22/18 )


Vital Signs (Adult) Q4H (6/22/18 05:05)


Cardiac Monitor / Telemetry .CONTINUOUS (6/22/18 05:05)


Intake + Output LORI.QSHIFT (6/22/18 05:05)


Sodium Chloride 0.9% Flush (Ns Flush) (6/22/18 05:15)


Sodium Chloride 0.9% Flush (Ns Flush) (6/22/18 09:00)


Acetaminophen (Tylenol) (6/22/18 05:15)


Basic Metabolic Panel (Bmp) (6/23/18 06:00)


Complete Blood Count With Diff (6/23/18 06:00)


Case Management Consult (6/22/18 05:05)


Heparin Inj (Heparin Inj) (6/22/18 06:00)


Scd Bilateral/Knee High LORI.BID (6/22/18 05:05)


Naloxone Inj (Narcan Inj) (6/22/18 05:15)


Ipratropium Neb (Atrovent Neb) (6/22/18 08:00)


Ipratropium Neb (Atrovent Neb) (6/22/18 05:15)


Diet Heart Healthy (6/22/18 Breakfast)


Metoprolol Tartrate Inj (Lopressor Inj) (6/22/18 05:30)





Labs





Laboratory Tests








Test


  6/22/18


01:35


 


White Blood Count 11.9 TH/MM3 


 


Red Blood Count 4.82 MIL/MM3 


 


Hemoglobin 14.0 GM/DL 


 


Hematocrit 43.1 % 


 


Mean Corpuscular Volume 89.4 FL 


 


Mean Corpuscular Hemoglobin 29.1 PG 


 


Mean Corpuscular Hemoglobin


Concent 32.5 % 


 


 


Red Cell Distribution Width 14.2 % 


 


Platelet Count 135 TH/MM3 


 


Mean Platelet Volume 10.1 FL 


 


Neutrophils (%) (Auto) 33.5 % 


 


Lymphocytes (%) (Auto) 55.7 % 


 


Monocytes (%) (Auto) 6.4 % 


 


Eosinophils (%) (Auto) 1.9 % 


 


Basophils (%) (Auto) 2.5 % 


 


Neutrophils # (Auto) 4.0 TH/MM3 


 


Lymphocytes # (Auto) 6.6 TH/MM3 


 


Monocytes # (Auto) 0.8 TH/MM3 


 


Eosinophils # (Auto) 0.2 TH/MM3 


 


Basophils # (Auto) 0.3 TH/MM3 


 


CBC Comment AUTO DIFF 


 


Differential Total Cells


Counted 100 


 


 


Neutrophils % (Manual) 33 % 


 


Lymphocytes % 62 % 


 


Monocytes % 3 % 


 


Eosinophils % 1 % 


 


Basophils % 1 % 


 


Neutrophils # (Manual) 3.9 TH/MM3 


 


Differential Comment


  FINAL DIFF


MANUAL


 


Smudge Cells PRESENT 


 


Platelet Estimate LOW 


 


Platelet Morphology Comment NORMAL 


 


Tear Drop Cells 1+ 


 


Ovalocytes 1+ 


 


Urine Color YELLOW 


 


Urine Turbidity CLEAR 


 


Urine pH 5.5 


 


Urine Specific Gravity 1.020 


 


Urine Protein NEG mg/dL 


 


Urine Glucose (UA) NEG mg/dL 


 


Urine Ketones NEG mg/dL 


 


Urine Occult Blood NEG 


 


Urine Nitrite NEG 


 


Urine Bilirubin NEG 


 


Urine Urobilinogen 0.2 MG/DL 


 


Urine Leukocyte Esterase NEG 


 


Urine WBC 0-2 /hpf 


 


Urine Squamous Epithelial


Cells 0-5 /hpf 


 


 


Microscopic Urinalysis Comment


  CULT NOT


INDICATED


 


Blood Urea Nitrogen 17 MG/DL 


 


Creatinine 0.70 MG/DL 


 


Random Glucose 106 MG/DL 


 


Total Protein 6.8 GM/DL 


 


Albumin 4.0 GM/DL 


 


Calcium Level 8.4 MG/DL 


 


Alkaline Phosphatase 94 U/L 


 


Aspartate Amino Transf


(AST/SGOT) 16 U/L 


 


 


Alanine Aminotransferase


(ALT/SGPT) 21 U/L 


 


 


Total Bilirubin 0.4 MG/DL 


 


Sodium Level 143 MEQ/L 


 


Potassium Level 3.6 MEQ/L 


 


Chloride Level 107 MEQ/L 


 


Carbon Dioxide Level 29.6 MEQ/L 


 


Anion Gap 6 MEQ/L 


 


Estimat Glomerular Filtration


Rate 109 ML/MIN 


 


 


Troponin I


  LESS THAN 0.02


NG/ML


 


B-Type Natriuretic Peptide 217 PG/ML 











MDM


Medical Decision Making


Medical Screen Exam Complete:  Yes


Emergency Medical Condition:  Yes


Medical Record Reviewed:  Yes


Interpretation(s)


The EKG shows atrial fibrillation with RVR and a rate of 133.  The BNP is 217.  

The troponin I is normal.  The chest x-ray shows underlying emphysema and 

scarring with no evidence of pneumonia.  The complete metabolic profile shows a 

calcium 8.4 but is otherwise normal.


Differential Diagnosis


COPD with acute exacerbation, pneumonia, atrial fibrillation with RVR, 

multifocal atrial tachycardia,


Narrative Course


The patient has atrial fibrillation with rapid ventricular response.  Review of 

his previous EKGs fails to show any previous atrial fibrillation.  He had sinus 

tachycardia and perhaps even multifocal atrial tachycardia in the past but not 

atrial fibrillation.  He is not on any anticoagulant.  The patient 

unfortunately is a poor historian and his history cannot be relied on.  The 

patient is apparently not on any medication for rate control or an 

anticoagulant.  I discussed the patient with Dr. Llanes and the patient will 

be admitted to her.





Physician Communication


Physician Communication


I discussed the patient with Dr. Shraddha Morales's physician assistant, the 

patient will be admitted to Dr. Llanes.





Diagnosis





 Primary Impression:  


 Atrial fibrillation, new onset


 Additional Impressions:  


 COPD with acute exacerbation


 Chronic dementia





Admitting Information


Admitting Physician Requests:  Admit











Mahamed Hill MD Jun 22, 2018 04:04

## 2018-06-23 VITALS
RESPIRATION RATE: 18 BRPM | TEMPERATURE: 96.5 F | SYSTOLIC BLOOD PRESSURE: 125 MMHG | HEART RATE: 84 BPM | OXYGEN SATURATION: 97 % | DIASTOLIC BLOOD PRESSURE: 80 MMHG

## 2018-06-23 VITALS
TEMPERATURE: 97.7 F | HEART RATE: 85 BPM | DIASTOLIC BLOOD PRESSURE: 83 MMHG | OXYGEN SATURATION: 96 % | SYSTOLIC BLOOD PRESSURE: 131 MMHG | RESPIRATION RATE: 18 BRPM

## 2018-06-23 VITALS
HEART RATE: 82 BPM | RESPIRATION RATE: 20 BRPM | SYSTOLIC BLOOD PRESSURE: 124 MMHG | OXYGEN SATURATION: 92 % | DIASTOLIC BLOOD PRESSURE: 65 MMHG

## 2018-06-23 VITALS
RESPIRATION RATE: 20 BRPM | TEMPERATURE: 96.5 F | SYSTOLIC BLOOD PRESSURE: 127 MMHG | DIASTOLIC BLOOD PRESSURE: 68 MMHG | OXYGEN SATURATION: 92 % | HEART RATE: 86 BPM

## 2018-06-23 VITALS — OXYGEN SATURATION: 94 %

## 2018-06-23 LAB
BASOPHILS # BLD AUTO: 0.1 TH/MM3 (ref 0–0.2)
BASOPHILS NFR BLD AUTO: 1 % (ref 0–2)
BASOPHILS NFR BLD: 1 % (ref 0–2)
BUN SERPL-MCNC: 21 MG/DL (ref 7–18)
CALCIUM SERPL-MCNC: 8.6 MG/DL (ref 8.5–10.1)
CHLORIDE SERPL-SCNC: 108 MEQ/L (ref 98–107)
CREAT SERPL-MCNC: 0.76 MG/DL (ref 0.6–1.3)
EOSINOPHIL # BLD: 0.1 TH/MM3 (ref 0–0.4)
EOSINOPHIL NFR BLD: 0.8 % (ref 0–4)
ERYTHROCYTE [DISTWIDTH] IN BLOOD BY AUTOMATED COUNT: 14.8 % (ref 11.6–17.2)
GFR SERPLBLD BASED ON 1.73 SQ M-ARVRAT: 99 ML/MIN (ref 89–?)
GLUCOSE SERPL-MCNC: 91 MG/DL (ref 74–106)
HCO3 BLD-SCNC: 28.7 MEQ/L (ref 21–32)
HCT VFR BLD CALC: 40.8 % (ref 39–51)
HGB BLD-MCNC: 13.6 GM/DL (ref 13–17)
LYMPHOCYTES # BLD AUTO: 7 TH/MM3 (ref 1–4.8)
LYMPHOCYTES NFR BLD AUTO: 46.5 % (ref 9–44)
LYMPHOCYTES: 60 % (ref 9–44)
MCH RBC QN AUTO: 30.3 PG (ref 27–34)
MCHC RBC AUTO-ENTMCNC: 33.4 % (ref 32–36)
MCV RBC AUTO: 90.5 FL (ref 80–100)
MONOCYTE #: 0.9 TH/MM3 (ref 0–0.9)
MONOCYTES NFR BLD: 6.4 % (ref 0–8)
MONOCYTES: 4 % (ref 0–8)
NEUTROPHILS # BLD AUTO: 6.7 TH/MM3 (ref 1.8–7.7)
NEUTROPHILS NFR BLD AUTO: 45.3 % (ref 16–70)
NEUTS BAND # BLD MANUAL: 5.2 TH/MM3 (ref 1.8–7.7)
NEUTS BAND NFR BLD: 1 % (ref 0–6)
NEUTS SEG NFR BLD MANUAL: 34 % (ref 16–70)
OVALOCYTES BLD QL SMEAR: (no result)
PLATELET # BLD: 124 TH/MM3 (ref 150–450)
PMV BLD AUTO: 9.4 FL (ref 7–11)
RBC # BLD AUTO: 4.5 MIL/MM3 (ref 4.5–5.9)
ROULEAUX BLD QL SMEAR: PRESENT
SMUDGE CELLS BLD QL SMEAR: PRESENT
SODIUM SERPL-SCNC: 144 MEQ/L (ref 136–145)
WBC # BLD AUTO: 14.8 TH/MM3 (ref 4–11)

## 2018-06-23 RX ADMIN — Medication SCH ML: at 08:29

## 2018-06-23 RX ADMIN — METOPROLOL TARTRATE SCH MG: 50 TABLET, FILM COATED ORAL at 06:16

## 2018-06-23 RX ADMIN — BUDESONIDE AND FORMOTEROL FUMARATE DIHYDRATE SCH PUFF: 160; 4.5 AEROSOL RESPIRATORY (INHALATION) at 08:28

## 2018-06-23 RX ADMIN — APIXABAN SCH MG: 2.5 TABLET, FILM COATED ORAL at 08:29

## 2018-06-23 RX ADMIN — IPRATROPIUM BROMIDE SCH MG: 0.5 SOLUTION RESPIRATORY (INHALATION) at 07:35

## 2018-06-23 NOTE — HHI.FF
Face to Face Verification


Diagnosis:  


(1) COPD exacerbation


(2) Atrial fibrillation, new onset


(3) Chronic dementia


Physical Therapy


Order:  Evaluate and Treat, Improve ambulation, Strength and gait training





Home Health Nursing








Order: Medical education





 Signs/symptoms of disease process





 Oxygen administration education





 Nursing assessment with vital signs

















I have seen patient Neal Cano on 6/23/18. My clinical findings support 

the need for the requested home health care services because:








 Ltd mobility - disease progression





 Patient has SOB





 Deconditioned w/ increased weakness





 Need for psychosocial assistance





 Impaired cognition/judgement





 High risk of falls





 Infection w/ risk of complications














I certify that my clinical findings support that this patient is homebound 

because:








 Impaired cognitive ability/safety





 Hx COPD- exertion dyspnea/weakness





 Unsteady gait/balance





 Need for psychosocial assistance





 Non-ambulatory-confined bed/chair





 Unable to use public transportation

















Yohana Ferris DO Jun 23, 2018 9:46 am

## 2018-06-23 NOTE — HHI.PR
Subjective


Remarks


Follow up for COPD exacerbation, Afib. Patient is currently doing well. No fever

, chills. Denies any chest pain, SOB. Tolerating diet well.





Objective


Vitals





Vital Signs








  Date Time  Temp Pulse Resp B/P (MAP) Pulse Ox O2 Delivery O2 Flow Rate FiO2


 


6/23/18 07:43 96.5 86 20 127/68 (87) 92   


 


6/23/18 04:00 96.5 84 18 125/80 (95) 97   


 


6/23/18 00:00 97.7 85 18 131/83 (99) 96   


 


6/22/18 20:00 96.7 61 18 118/77 (91) 97   


 


6/22/18 19:28     97 Nasal Cannula 2.00 


 


6/22/18 16:00 97.0 80 16 110/68 (82) 94   


 


6/22/18 12:00 97.7 120 18 107/56 (73) 96   














I/O      


 


 6/22/18 6/22/18 6/22/18 6/23/18 6/23/18 6/23/18





 07:00 15:00 23:00 07:00 15:00 23:00


 


Intake Total   60 ml 120 ml  


 


Output Total 500 ml     


 


Balance -500 ml  60 ml 120 ml  


 


      


 


Intake Oral   60 ml 120 ml  


 


Output Urine Total 500 ml     


 


# Voids 2 2  2  


 


# Bowel Movements 0     








Result Diagram:  


6/23/18 0600                                                                   

             6/23/18 0600





Imaging





Last Impressions








Chest X-Ray 6/22/18 0000 Signed





Impressions: 





 CONCLUSION: 





 Underlying emphysema and scarring with no evidence of pneumonia.





  





 








Objective Remarks


GENERAL: Alert, NAD. 


SKIN: Warm and dry.


HEAD: Normocephalic.


EYES: No scleral icterus. No injection or drainage. 


NECK: Supple, trachea midline. No JVD or lymphadenopathy.


CARDIOVASCULAR: Regular rate and rhythm without murmurs, gallops, or rubs. 


RESPIRATORY: Breath sounds equal bilaterally. No accessory muscle use.


GASTROINTESTINAL: Abdomen soft, non-tender, nondistended. 


MUSCULOSKELETAL: No cyanosis, or edema. 


BACK: Nontender without obvious deformity. No CVA tenderness.





Procedures


None.





A/P


Problem List:  


(1) Atrial fibrillation, new onset


ICD Code:  I48.91 - Unspecified atrial fibrillation


Status:  Acute


(2) COPD with acute exacerbation


ICD Code:  J44.1 - Chronic obstructive pulmonary disease with (acute) 

exacerbation


Status:  Acute


Assessment and Plan


Mr. Cano is a 77-year-old  male with a history of dementia, COPD 

who presents to the emergency department on 6/22/2018 due to shortness of breath

, a feeling of palpitation.  In the ED he was found to have atrial fibrillation 

with RVR.





Acute COPD exacerbation


   -We will continue supplemental oxygen to keep O2 saturation around 90%.


   -Breathing treatment with ipratropium


   -Symbicort twice a day and continue Azithromycin. 





Atrial fibrillation


   -[id number] score 2 (age over 75)


   -I discussed with patient's son regarding anticoagulation.  


   -Continue apixaban 2.5 mg twice daily - patient is close to 78 years old and 

his weight is 58.1 kg.


   -Metoprolol 25 mg every 12 hours for rate control.  If needed we will add 

calcium channel blocker.


   -Patient will be discharged home today.  He needs to follow-up with his 

primary care physician with regards to COPD as well as A. fib.  If needed PCP 

can refer patient to cardiology.





Tobacco abuse


Alcohol abuse


   -Counseled patient regarding alcohol and tobacco abuse.





Full code.  Apixaban 2.5 mg twice daily





Discharge patient to home with home health


Condition on discharge: Improved


Heart healthy diet as tolerated


Ad Shavonne activity


Rx written:





Apixaban 2.5 mg twice daily


Azithromycin 250 mg p.o. daily #3


Metoprolol tartrate 25 mg p.o. every 12 hours #60


Symbicort 1604 0.5 mcg inhaler





Follow-up with primary care physician within 1 week











Yohana Ferris DO Jun 23, 2018 8:26 am

## 2023-01-16 NOTE — MD
cc:

RASHAD SHARMA MD

****

 

ADMISSION DATE: 12/28/2017  DISCHARGE DATE:  1/1/2018

 

Okay to discharge the patient to skilled nursing facility.

 

Condition at the time of discharge satisfactory.

 

Activity as tolerated.

 

Diet, cardiac diet.

 

ALLERGIES

NO KNOWN DRUG ALLERGIES.

 

DISCHARGE MEDICATIONS

Include:

1. B12 1000 micrograms p.o. daily tablet.

2. Albuterol two puff inhalation 4-6h.

3. Levaquin 500 milligrams p.o. daily for five days.

 

The patient advised to follow up with primary care physician one week.

 

ADMISSION DIAGNOSIS

1. Shortness of breath secondary to COPD exacerbation.  The patient oxygen

   saturation was 83% at room air at the time of admission.  The patient was

   given Levaquin 500 mg IV daily, albuterol, ipratropium nebulization and

   Solu-Medrol IV.  The patient's shortness of breath and COPD exacerbation

   resolved.

2. Altered mental status. The patient has dementia.  The patient's metabolic

   checkup and CT brain was done and shows only B12 deficiency.  The B12 level

   was in the lower normal limits.

3. History of COPD.

4. Dementia.

5. Mild leukocytosis most likely secondary to steroids.

 

HOSPITAL COURSE

This is a 77-year male admitted with shortness of breath and dementia.  The

patient was given empiric antibiotic as well as DuoNeb and Solu-Medrol.  The

patient remained stable.  The patient discharged in satisfactory condition.

Please see the further details in the medical record.

 

 

                              _________________________________

                              Rashad Sharma MD

 

 

 

EA/FAITH

D:  12/31/2017/7:23 PM

T:  1/2/2018/7:19 AM

Visit #:  [id number]

Job #:  [id number] {PT Note Title:[id number]}    Visit Type: Daily Treatment Note  Visit: 4  Referring Provider: Karli gNuyen NP  Medical Diagnosis (from order): Diagnosis Information    Diagnosis  724.2, 724.3, 338.29 (ICD-9-CM) - M54.41, G89.29 (ICD-10-CM) - Chronic midline low back pain with right-sided sciatica       Patient alert and oriented X3.      OBJECTIVE                                                                                                                                       Treatment     Therapeutic Exercise  Patient performed on this date 1/16/23  *lumbar rotations 3x10\" Bilateral   *piriformis stretch 3x20\" Bilateral   *alternate hip flexion with ab set 10x5\" Bilateral   *BKFO with ab set 10x5\" Bilateral   *straight leg raise with ab set 5x5\" Bilateral   Added:  *Sidelying hip abduction 5x Bilateral   *standing hip flexion/ext 10x Bilateral   *squats 10x with ab set      HEP  *SKTC stretch 3x20\" Bilateral  *90/90 hamstrings with active df/pf of foot 3x10 reps Bilateral   *ab set with pelvic neutral position 3x5\"         Skilled input: verbal instruction/cues, tactile instruction/cues and posture correction    Writer verbally educated and received verbal consent for hand placement, positioning of patient, and techniques to be performed today from patient for clothing adjustments for techniques, therapist position for techniques and hand placement and palpation for techniques as described above and how they are pertinent to the patient's plan of care.    Home Exercise Program  *above indicates provided as part of home exercise program  Access Code: [email or web address]  URL: [email or web address]  Date: 01/06/2023  Prepared by: Barbara Stevens    Exercises  ? Supine Lower Trunk Rotation - 2 x daily - 7 x weekly - 1-2 sets - 3-5 reps - 10 hold  ? Supine Single Knee to Chest Stretch - 2 x daily - 7 x weekly - 1 sets - 3 reps - 20 hold  ? Supine Hamstring Stretch - 2 x daily - 7 x weekly - 1 sets - 3  reps - 20 hold  ? Supine Piriformis Stretch with Foot on Ground - 2 x daily - 7 x weekly - 1 sets - 3 reps - 20 hold  ? Supine Posterior Pelvic Tilt - 2 x daily - 7 x weekly - 1 sets - 10 reps - 5 hold  ? Supine March - 2 x daily - 7 x weekly - 1 sets - 10 reps - 5 hold  ? Bent Knee Fallouts - 2 x daily - 7 x weekly - 1 sets - 10 reps - 5 hold  ? Active Straight Leg Raise with Quad Set - 2 x daily - 7 x weekly - 1 sets - 10 reps - 5 hold           ASSESSMENT                                                                                                          Education:   - Results of above outlined education: Verbalizes understanding and Demonstrates understanding    PLAN                                                                                                                           Suggestions for next session as indicated: Progress per plan of care       Therapy procedure time and total treatment time can be found documented on the Time Entry flowsheet